# Patient Record
Sex: MALE | Race: BLACK OR AFRICAN AMERICAN | NOT HISPANIC OR LATINO | Employment: OTHER | URBAN - METROPOLITAN AREA
[De-identification: names, ages, dates, MRNs, and addresses within clinical notes are randomized per-mention and may not be internally consistent; named-entity substitution may affect disease eponyms.]

---

## 2017-01-20 DIAGNOSIS — J44.1 COPD EXACERBATION: ICD-10-CM

## 2017-01-20 DIAGNOSIS — J01.90 ACUTE SINUSITIS, UNSPECIFIED: ICD-10-CM

## 2017-01-20 DIAGNOSIS — R05.9 COUGH: ICD-10-CM

## 2017-01-20 RX ORDER — AZITHROMYCIN 250 MG/1
TABLET, FILM COATED ORAL
Qty: 6 TABLET | Refills: 1 | Status: SHIPPED | OUTPATIENT
Start: 2017-01-20 | End: 2017-08-29 | Stop reason: ALTCHOICE

## 2017-03-20 DIAGNOSIS — J44.1 COPD EXACERBATION: ICD-10-CM

## 2017-03-20 DIAGNOSIS — R05.9 COUGH: ICD-10-CM

## 2017-03-20 RX ORDER — METHYLPREDNISOLONE 4 MG/1
TABLET ORAL
Qty: 21 TABLET | Refills: 1 | Status: SHIPPED | OUTPATIENT
Start: 2017-03-20 | End: 2017-08-29 | Stop reason: ALTCHOICE

## 2017-08-28 RX ORDER — FLUTICASONE PROPIONATE 50 MCG
SPRAY, SUSPENSION (ML) NASAL
Qty: 16 G | Refills: 5 | Status: SHIPPED | OUTPATIENT
Start: 2017-08-28 | End: 2021-03-03

## 2017-08-29 ENCOUNTER — PROCEDURE VISIT (OUTPATIENT)
Dept: PULMONOLOGY | Facility: CLINIC | Age: 67
End: 2017-08-29
Payer: MEDICARE

## 2017-08-29 ENCOUNTER — OFFICE VISIT (OUTPATIENT)
Dept: PULMONOLOGY | Facility: CLINIC | Age: 67
End: 2017-08-29
Payer: MEDICARE

## 2017-08-29 ENCOUNTER — HOSPITAL ENCOUNTER (OUTPATIENT)
Dept: RADIOLOGY | Facility: HOSPITAL | Age: 67
Discharge: HOME | End: 2017-08-29
Attending: INTERNAL MEDICINE
Payer: MEDICARE

## 2017-08-29 VITALS
SYSTOLIC BLOOD PRESSURE: 150 MMHG | WEIGHT: 240 LBS | OXYGEN SATURATION: 99 % | BODY MASS INDEX: 32.51 KG/M2 | HEIGHT: 72 IN | RESPIRATION RATE: 20 BRPM | DIASTOLIC BLOOD PRESSURE: 88 MMHG | HEART RATE: 78 BPM

## 2017-08-29 DIAGNOSIS — J45.20 MILD INTERMITTENT ASTHMA WITHOUT COMPLICATION: ICD-10-CM

## 2017-08-29 DIAGNOSIS — J44.89 ASTHMA WITH COPD: ICD-10-CM

## 2017-08-29 DIAGNOSIS — G47.33 OSA ON CPAP: Primary | ICD-10-CM

## 2017-08-29 PROCEDURE — 99999 PR PBB SHADOW E&M-EST. PATIENT-LVL IV: CPT | Mod: PBBFAC,,, | Performed by: NURSE PRACTITIONER

## 2017-08-29 PROCEDURE — 71020 XR CHEST PA AND LATERAL: CPT | Mod: TC,PO

## 2017-08-29 PROCEDURE — 3008F BODY MASS INDEX DOCD: CPT | Mod: S$GLB,,, | Performed by: NURSE PRACTITIONER

## 2017-08-29 PROCEDURE — 71020 XR CHEST PA AND LATERAL: CPT | Mod: 26,,, | Performed by: RADIOLOGY

## 2017-08-29 PROCEDURE — 99214 OFFICE O/P EST MOD 30 MIN: CPT | Mod: 25,S$GLB,, | Performed by: NURSE PRACTITIONER

## 2017-08-29 PROCEDURE — 94060 EVALUATION OF WHEEZING: CPT | Mod: S$GLB,,, | Performed by: INTERNAL MEDICINE

## 2017-08-29 RX ORDER — PROMETHAZINE HYDROCHLORIDE AND CODEINE PHOSPHATE 6.25; 1 MG/5ML; MG/5ML
SOLUTION ORAL
Refills: 0 | COMMUNITY
Start: 2017-08-13 | End: 2021-01-19 | Stop reason: SDUPTHER

## 2017-08-29 RX ORDER — FLUTICASONE FUROATE AND VILANTEROL 200; 25 UG/1; UG/1
1 POWDER RESPIRATORY (INHALATION) DAILY
Qty: 1 EACH | Refills: 11 | Status: SHIPPED | OUTPATIENT
Start: 2017-08-29 | End: 2018-10-30 | Stop reason: SDUPTHER

## 2017-08-29 RX ORDER — DICLOFENAC SODIUM 10 MG/G
GEL TOPICAL
COMMUNITY
Start: 2017-07-23 | End: 2021-10-26

## 2017-08-29 RX ORDER — GABAPENTIN 300 MG/1
300 CAPSULE ORAL 3 TIMES DAILY
COMMUNITY
Start: 2017-08-08

## 2017-08-29 RX ORDER — FLUTICASONE PROPIONATE AND SALMETEROL 500; 50 UG/1; UG/1
1 POWDER RESPIRATORY (INHALATION) 2 TIMES DAILY
COMMUNITY
End: 2017-08-29

## 2017-08-29 NOTE — PROGRESS NOTES
"Subjective:      Chief Complaint   Patient presents with    Asthma    COPD        Speedy Valle is an 67 y.o. male who presents for follow up of asthma.   The patient is currently having symptoms. Current symptoms include chest tightness, dyspnea, non-productive cough and wheezing.   Symptoms have been present since several years ago and have been unchanged. He denies chest pain and productive cough.   Associated symptoms include shortness of breath and wheezing.    This episode appears to have been triggered by cold air, dust, pollens and strong odors.   Treatments tried for the current exacerbation include leukotriene inhibitors, long-acting inhaled beta-adrenergic agonists and short-acting inhaled beta-adrenergic agonists, which have provided some relief of symptoms.   Patient reports he feels he has chronic flares over many years, states good days and bad days.   He feels his asthma could be better controlled.   Current Disease Severity  Speedy has weekly daytime asthma symptoms.  He has frequent nighttime asthma symptoms with coughing through the night. (decision to  The patient is using short-acting beta agonists for symptom control more than 2 days per week but not more than once a day.  He has exacerbations requiring oral systemic corticosteroids 2 times per year.   Current limitations in activity from asthma: some limitations, able to do about 15 minutes of activity, rest a few minutes then able to return to activity.   He has regular work out program walking on TM once a week x 30 minutes.  Swim one day a week, 10 laps (30 minutes).     Cardiologist: Dr. Monique, last seen "few" months ago.     On CPAP followed by Dr. Schwab at [facility].      Review of Systems  A comprehensive review of systems was negative except for: Ears, nose, mouth, throat, and face: positive for post nasal drp  Respiratory: positive for cough, dyspnea on exertion and wheezing      Objective:      Oxygen saturation 99%% on " room air   chest xray 8/29/2017  Comparison: 09/23/2015  Findings: The lungs are clear and free of infiltrate.    No pleural effusion or pneumothorax is identified.  The heart is not enlarged.  IMPRESSION:   1.  No acute cardiopulmonary process.    Pulmonary function tests: 8/29/2017 FEV1: 2.64  (81 % predicted), FVC:  3.43 (81 % predicted), FEV1/FVC:  77 (99 % predicted).   Post bronchodilator: FEV1: 3.00  (92 % predicted), FVC:  3.75 (88 % predicted), FEV1/FVC:  80 (103 % predicted).   Normal spirometry stable compared to 8/24/2016.     Assessment:      Moderate persistent asthma, ongoing.     ANDRZEJ on CPAP       Plan:     Problem List Items Addressed This Visit     Asthma with COPD     Not compliant with every 12 hr Advair  D/C advair  Begin Breo          Relevant Medications    fluticasone-vilanterol (BREO ELLIPTA) 200-25 mcg/dose DsDv diskus inhaler    ANDRZEJ on CPAP - Primary     Followed by [facility] with Dr. J.K. Schwab            Other Visit Diagnoses    None.        Review treatment goals of symptom prevention, minimizing limitation in activity, prevention of exacerbations and use of ER/inpatient care, maintenance of optimal pulmonary function and minimization of adverse effects of treatment.  Discussed distinction between quick-relief and controlled medications.  Discussed medication dosage, use, side effects, and goals of treatment in detail.    Warning signs of respiratory distress were reviewed with the patient.   Discussed avoidance of precipitants.      Return in about 4 weeks (around 9/26/2017) for Asthma/COPD follow up after med change to Breo . bring in cpap for download..

## 2017-08-29 NOTE — LETTER
August 29, 2017      Alfred Bernardo MD  [address] [facility] [address]  [place] [address]           [facility]  [address] [facility] [address] [place] [address]  Phone: [phone]  Fax: [phone]          Patient: Speedy Valle   MR Number: [id number]   YOB: 1950   Date of Visit: 8/29/2017       Dear Dr. Alfred Bernardo:    Thank you for referring Speedy Valle to me for evaluation. Attached you will find relevant portions of my assessment and plan of care.    If you have questions, please do not hesitate to call me. I look forward to following Speedy Valle along with you.    Sincerely,    Vero Olivares, NP    Enclosure  CC:  No Recipients    If you would like to receive this communication electronically, please contact [email or web address] or [phone] to request more information on [facility] access.    For providers and/or their staff who would like to refer a patient to [facility], please contact us through our one-stop-shop provider referral line, [facility] , at [phone].    If you feel you have received this communication in error or would no longer like to receive these types of communications, please e-mail [email or web address]

## 2017-09-05 LAB
POST FEF 25 75: 3.02 L/S (ref 1.95–3.84)
POST FET 100: 10.04 S
POST FEV1 FVC: 80 %
POST FEV1: 3 L (ref 2.82–3.7)
POST FIF 50: 1.28 L/S
POST FVC: 3.75 L (ref 3.74–4.74)
POST PEF: 9.01 L/S (ref 7.32–10.13)
PRE FEF 25 75: 2.3 L/S (ref 1.95–3.84)
PRE FET 100: 10.06 S
PRE FEV1 FVC: 77 %
PRE FEV1: 2.64 L (ref 2.82–3.7)
PRE FIF 50: 2.39 L/S
PRE FVC: 3.43 L (ref 3.74–4.74)
PRE PEF: 7.21 L/S (ref 7.32–10.13)
PREDICTED FEV1 FVC: 77.36 % (ref 72.15–82.57)
PREDICTED FEV1: 3.26 L (ref 2.82–3.7)
PREDICTED FVC: 4.24 L (ref 3.74–4.74)
PROVOCATION PROTOCOL: ABNORMAL

## 2017-09-29 ENCOUNTER — OFFICE VISIT (OUTPATIENT)
Dept: PULMONOLOGY | Facility: CLINIC | Age: 67
End: 2017-09-29
Payer: MEDICARE

## 2017-09-29 VITALS
RESPIRATION RATE: 20 BRPM | WEIGHT: 236.75 LBS | BODY MASS INDEX: 32.07 KG/M2 | OXYGEN SATURATION: 98 % | SYSTOLIC BLOOD PRESSURE: 120 MMHG | HEIGHT: 72 IN | HEART RATE: 64 BPM | DIASTOLIC BLOOD PRESSURE: 76 MMHG

## 2017-09-29 DIAGNOSIS — R05.9 COUGH: ICD-10-CM

## 2017-09-29 DIAGNOSIS — J30.9 CHRONIC ALLERGIC RHINITIS, UNSPECIFIED SEASONALITY, UNSPECIFIED TRIGGER: ICD-10-CM

## 2017-09-29 DIAGNOSIS — J45.20 MILD INTERMITTENT ASTHMA WITHOUT COMPLICATION: Primary | ICD-10-CM

## 2017-09-29 PROCEDURE — 99214 OFFICE O/P EST MOD 30 MIN: CPT | Mod: S$GLB,,, | Performed by: NURSE PRACTITIONER

## 2017-09-29 PROCEDURE — 99999 PR PBB SHADOW E&M-EST. PATIENT-LVL IV: CPT | Mod: PBBFAC,,, | Performed by: NURSE PRACTITIONER

## 2017-09-29 PROCEDURE — 3008F BODY MASS INDEX DOCD: CPT | Mod: S$GLB,,, | Performed by: NURSE PRACTITIONER

## 2017-09-29 RX ORDER — BENZONATATE 200 MG/1
200 CAPSULE ORAL 3 TIMES DAILY PRN
Qty: 30 CAPSULE | Refills: 3 | Status: SHIPPED | OUTPATIENT
Start: 2017-09-29 | End: 2018-02-11 | Stop reason: SDUPTHER

## 2017-09-29 NOTE — PROGRESS NOTES
"Subjective:      Chief Complaint   Patient presents with    Sleep Apnea    Asthma        Speedy Valle is an 67 y.o. male who presents for follow up of asthma after change of med from Advair to Breo.  He had felt his asthma had not been well controlled however was not compliant with Advair twice a day use.  Changed to once daily Breo at his last visit with me on 8/29/2017.  The patient is not currently have symptoms / an exacerbation.   Since beginning Breo he feels improved with controlled asthma.  ACT score: 23  Current medication regimen: Breo, has not used rescue since beginning breo, has used duo neb twice over past 4 weeks.    Current Disease SeverityDan has weekly daytime asthma symptoms.   He has no wheezing, but has tickling in throat at night that elicits coughing. He is not compliant with Flonase. 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 .   The patient is using short-acting beta agonists for symptom control less than or equal to 2 days per week.   He has exacerbations requiring oral systemic corticosteroids 2 times per year.   Current limitations in activity from asthma: none. Has some physical limitations related to fatigue, no wheezing or coughing, able to do about 15 minutes of activity, rest a few minutes then able to return to activity.   He has regular work out program swims twice a week, 10 laps (30 minutes).      Cardiologist: Dr. Monique, last seen "few" months ago.      On CPAP followed by Dr. Schwab at [facility], he did not bring in CPAP for download today.   He reports since his visit on 8/29/2017 with discussion of need to use nightly, he used nightly up until the past 1 week, stopped using related to staying up late and up often during night taking care of his wife who has chronic pain.   He understands importance of treatment and encourage use nightly over 4 hrs.     Review of Systems  A comprehensive review of systems was negative except for: Ears, nose, mouth, throat, " and face: positive for post nasal drip with cough upon lying down.       Objective:      Oxygen saturation 98% on room air  /76 (BP Location: Right arm, Patient Position: Sitting)   Pulse 64   Resp 20   Ht 6' (1.829 m)   Wt 107.4 kg (236 lb 12.4 oz)   SpO2 98%   BMI 32.11 kg/m²   General appearance: alert, appears stated age and cooperative  Head: Normocephalic, without obvious abnormality, atraumatic  Eyes: negative  Ears: normal TM's and external ear canals both ears  Nose: Nares normal. Septum midline. Mucosa normal. No drainage or sinus tenderness.  Throat: lips, mucosa, and tongue normal; teeth and gums normal  Neck: no adenopathy, no carotid bruit and supple, symmetrical, trachea midline  Lungs: clear to auscultation bilaterally  Heart: regular rate and rhythm, S1, S2 normal, no murmur, click, rub or gallop  Abdomen: normal findings: soft, non-tender  Extremities: extremities normal, atraumatic, no cyanosis or edema  Pulses: 2+ and symmetric  Skin: Skin color, texture, turgor normal. No rashes or lesions  Lymph nodes: Cervical, supraclavicular, and axillary nodes normal.  Neurologic: Grossly normal      Pulmonary function tests: 8/29/2017 FEV1: 2.64  (81 % predicted), FVC:  3.43 (81 % predicted), FEV1/FVC:  77 (99 % predicted).   Post bronchodilator: FEV1: 3.00  (92 % predicted), FVC:  3.75 (88 % predicted), FEV1/FVC:  80 (103 % predicted).   Normal spirometry stable compared to 8/24/2016. Improvement after bronchodilator suggest asthmatic component.    Assessment:      Intermittent asthma, improved.     chronic allergic rhinitis    cough   Plan:     Problem List Items Addressed This Visit     Chronic allergic rhinitis     Continue Singulair, resume zyrtec and flonase         Mild intermittent asthma without complication - Primary     Well controlled on Breo and Singulair. ACT 23.   Continue breo daily. Duo neb, Albuterol inhaler if needed.          Relevant Orders    Spirometry with/without  bronchodilator      Other Visit Diagnoses     Cough        pt complains of occasional cough with post nasal drip at night, resume flonase. was taking promethazine with codeine gave alternate tessalon non narcotic    Relevant Medications    benzonatate (TESSALON) 200 MG capsule         Review treatment goals of symptom prevention, minimizing limitation in activity, prevention of exacerbations and use of ER/inpatient care, maintenance of optimal pulmonary function and minimization of adverse effects of treatment.  Discussed distinction between quick-relief and controlled medications.  Discussed medication dosage, use, side effects, and goals of treatment in detail.    Warning signs of respiratory distress were reviewed with the patient.   Discussed avoidance of precipitants.      Return in about 6 months (around 3/29/2018) for asthma follow up, w/review rahel.

## 2017-09-29 NOTE — ASSESSMENT & PLAN NOTE
Well controlled on Breo and Singulair. ACT 23.   Continue breo daily. Duo neb, Albuterol inhaler if needed.

## 2018-02-11 DIAGNOSIS — R05.9 COUGH: ICD-10-CM

## 2018-02-12 RX ORDER — BENZONATATE 200 MG/1
CAPSULE ORAL
Qty: 30 CAPSULE | Refills: 1 | Status: SHIPPED | OUTPATIENT
Start: 2018-02-12 | End: 2018-03-20 | Stop reason: SDUPTHER

## 2018-03-20 DIAGNOSIS — R05.9 COUGH: ICD-10-CM

## 2018-03-20 RX ORDER — BENZONATATE 200 MG/1
CAPSULE ORAL
Qty: 90 CAPSULE | Refills: 0 | Status: SHIPPED | OUTPATIENT
Start: 2018-03-20 | End: 2021-03-03

## 2018-04-03 ENCOUNTER — PROCEDURE VISIT (OUTPATIENT)
Dept: PULMONOLOGY | Facility: CLINIC | Age: 68
End: 2018-04-03
Payer: MEDICARE

## 2018-04-03 ENCOUNTER — OFFICE VISIT (OUTPATIENT)
Dept: PULMONOLOGY | Facility: CLINIC | Age: 68
End: 2018-04-03
Payer: MEDICARE

## 2018-04-03 VITALS
OXYGEN SATURATION: 98 % | HEART RATE: 74 BPM | RESPIRATION RATE: 18 BRPM | WEIGHT: 245 LBS | SYSTOLIC BLOOD PRESSURE: 130 MMHG | DIASTOLIC BLOOD PRESSURE: 70 MMHG | HEIGHT: 72 IN | BODY MASS INDEX: 33.18 KG/M2

## 2018-04-03 DIAGNOSIS — J01.90 ACUTE NON-RECURRENT SINUSITIS, UNSPECIFIED LOCATION: ICD-10-CM

## 2018-04-03 DIAGNOSIS — J45.31 MILD PERSISTENT ASTHMA WITH ACUTE EXACERBATION: Primary | ICD-10-CM

## 2018-04-03 DIAGNOSIS — J30.89 CHRONIC NON-SEASONAL ALLERGIC RHINITIS, UNSPECIFIED TRIGGER: ICD-10-CM

## 2018-04-03 DIAGNOSIS — J45.20 MILD INTERMITTENT ASTHMA WITHOUT COMPLICATION: ICD-10-CM

## 2018-04-03 DIAGNOSIS — G47.33 OSA ON CPAP: ICD-10-CM

## 2018-04-03 PROCEDURE — 99999 PR PBB SHADOW E&M-EST. PATIENT-LVL IV: CPT | Mod: PBBFAC,,, | Performed by: NURSE PRACTITIONER

## 2018-04-03 PROCEDURE — 94060 EVALUATION OF WHEEZING: CPT | Mod: S$GLB,,, | Performed by: INTERNAL MEDICINE

## 2018-04-03 PROCEDURE — 99214 OFFICE O/P EST MOD 30 MIN: CPT | Mod: 25,S$GLB,, | Performed by: NURSE PRACTITIONER

## 2018-04-03 RX ORDER — AZELASTINE 1 MG/ML
2 SPRAY, METERED NASAL 2 TIMES DAILY
Qty: 30 ML | Refills: 11 | Status: SHIPPED | OUTPATIENT
Start: 2018-04-03 | End: 2021-03-03

## 2018-04-03 RX ORDER — AMOXICILLIN AND CLAVULANATE POTASSIUM 875; 125 MG/1; MG/1
1 TABLET, FILM COATED ORAL EVERY 12 HOURS
Qty: 20 TABLET | Refills: 0 | Status: SHIPPED | OUTPATIENT
Start: 2018-04-03 | End: 2021-03-03

## 2018-04-03 RX ORDER — PREDNISONE 20 MG/1
TABLET ORAL
Qty: 20 TABLET | Refills: 0 | Status: SHIPPED | OUTPATIENT
Start: 2018-04-03 | End: 2018-10-30 | Stop reason: SDUPTHER

## 2018-04-03 NOTE — ASSESSMENT & PLAN NOTE
Presents with sinus flare.  Continue flonase, zyrtec add astelin for allergic rhinitis not controlled on flonase and zyrtec

## 2018-04-03 NOTE — ASSESSMENT & PLAN NOTE
Presents with complaint of intermittent wheeze   ACT score 13   Jonny 4/3/2018 Normal air flow, no bronchodilator response  Continue breo, albuterol   Complete prednisone taper

## 2018-04-03 NOTE — ASSESSMENT & PLAN NOTE
Prior followed by [facility], Dr. K. Schwab  No follow up in about 2 years  [facility]  Needs new supplies and mask, reported not compliant, needs new mask and supplies.  Order provided.  Follow up 7 weeks for compliance download.

## 2018-04-03 NOTE — PROGRESS NOTES
Chief Complaint   Patient presents with    COPD    Asthma     Subjective:       Speedy Valle is a 68 y.o. male who presents for evaluation of asthma.  The patient has a history of asthma.   Age when diagnosed with Asthma adult.   The patient is currently have symptoms of coughing a lot at night. There is intermittent wheezing.   He saw Primary Care Provider, Dr. Mcqueen last month and prescribed steroid pack and antibiotic.  He feels he has not completely resolved with continue chest congestion, dry aggravating cough, post nasal drip and intermittent wheeze.     Current Disease Severity  Speedy has weekly daytime asthma symptoms. He has frequent nighttime asthma symptoms. The patient is using short-acting beta agonists for symptom control less than or equal to 2 days per week. He has exacerbations requiring oral systemic corticosteroids 1 times per year last month. Current limitations in activity from asthma: none.     CPAP   Not compliant with CPAP use. Prior patient of [facility], Dr. Schwab. Has not seen Dr. Schwab since set up with present machine about in 2016. Has been on CPAP over past 7 years.   Has complaint of needing new supplies and mask. Not compliant with regular use in part related to discomfort of full face mask.   New order for CPAP mask.      Past Medical History: The following portions of the patient's history were reviewed and updated as appropriate:   He  has a past surgical history that includes Cardiac catheterization (2010).  His family history includes Hypertension in his mother; Stroke in his mother.  He  reports that he quit smoking about 35 years ago. His smoking use included Cigarettes. He has a 21.00 pack-year smoking history. He quit smokeless tobacco use about 21 years ago. His smokeless tobacco use included Snuff. He reports that he does not drink alcohol or use drugs.  He has a current medication list which includes the following prescription(s): atorvastatin, benzonatate,  clopidogrel, diclofenac sodium, fluticasone, fluticasone-vilanterol, gabapentin, hydrocodone-acetaminophen 10-325mg, inhalation spacing device, metoprolol succinate, pantoprazole, promethazine-codeine 6.25-10 mg/5 ml, ranolazine, tamsulosin, albuterol, albuterol-ipratropium 2.5mg-0.5mg/3ml, amoxicillin-clavulanate 875-125mg, azelastine, montelukast, pramipexole, and prednisone.  He has No Known Allergies..    Review of Systems  Review of Systems   Constitutional: Negative for fever, chills, weight loss, weight gain, activity change, appetite change, fatigue and night sweats.   HENT: Negative for postnasal drip, rhinorrhea, sinus pressure, voice change and congestion.    Eyes: Negative for redness and itching.   Respiratory: Positive for cough, wheezing (intermittent) and dyspnea on extertion ( with prolonged exertion ). Negative for snoring, sputum production, chest tightness, shortness of breath, orthopnea, asthma nighttime symptoms, use of rescue inhaler and somnolence.    Cardiovascular: Negative.  Negative for chest pain, palpitations and leg swelling.   Genitourinary: Negative for difficulty urinating and hematuria.   Endocrine: Negative for cold intolerance and heat intolerance.    Musculoskeletal: Negative for arthralgias, gait problem, joint swelling and myalgias.   Skin: Negative.    Gastrointestinal: Negative for nausea, vomiting, abdominal pain and acid reflux.   Neurological: Negative for dizziness, weakness, light-headedness and headaches.   Hematological: Negative for adenopathy. No excessive bruising.   All other systems reviewed and are negative.    Objective:     /70 (BP Location: Right arm, Patient Position: Sitting)   Pulse 74   Resp 18   Ht 6' (1.829 m)   Wt 111.1 kg (245 lb)   SpO2 98%   BMI 33.23 kg/m²   Physical Exam   Constitutional: He is oriented to person, place, and time. He appears well-developed and well-nourished. He is active and cooperative.  Non-toxic appearance. He does  not appear ill. No distress.   HENT:   Head: Normocephalic and atraumatic.   Right Ear: External ear normal.   Left Ear: External ear normal.   Nose: Nose normal.   Mouth/Throat: Oropharynx is clear and moist. No oropharyngeal exudate.   Eyes: Conjunctivae are normal.   Neck: Normal range of motion. Neck supple.   Cardiovascular: Normal rate, regular rhythm, normal heart sounds and intact distal pulses.    Pulmonary/Chest: Effort normal and breath sounds normal. He has no wheezes. He has no rales.   Abdominal: Soft. Bowel sounds are normal.   Musculoskeletal: He exhibits no edema.   Neurological: He is alert and oriented to person, place, and time.   Skin: Skin is warm and dry.   Psychiatric: He has a normal mood and affect. His behavior is normal. Judgment and thought content normal.   Vitals reviewed.    Diagnostic Testing Reviewed  All relevant imaging and labs reviewed.  Pulmonary function tests: 4/3/2018 FEV1: 2.92  (90 % predicted), FVC:  3.69 (87 % predicted), FEV1/FVC:  79 (103 % predicted).  Post bronchodilator: FEV1: 2.99  (92 % predicted), FVC:  3.66 (87 % predicted), FEV1/FVC:  82 (106 % predicted).   Normal spirometry. No bronchodilator response.   FEV1 improved 11% compared to 8/2017.     Assessment:     1. Mild persistent asthma with acute exacerbation    2. Acute non-recurrent sinusitis, unspecified location    3. Chronic non-seasonal allergic rhinitis, unspecified trigger    4. Mild intermittent asthma without complication    5. ANDRZEJ on CPAP      Orders Placed This Encounter   Procedures    CPAP/BIPAP SUPPLIES     Prior patient of Dr. K. Schwab  Needs new supply order and new mask, not compliant related needs new supplies and new better fitting mask.  90 day supply. 4 refills.    [facility]: new to [facility]    compliance follow up in provider office in 7 weeks     Order Specific Question:   Type of mask:     Answer:   FFM     Order Specific Question:   Headgear?     Answer:   Yes     Order Specific  Question:   Tubing?     Answer:   Yes     Order Specific Question:   Humidifier chamber?     Answer:   Yes     Order Specific Question:   Chin strap?     Answer:   Yes     Order Specific Question:   Filters?     Answer:   Yes     Order Specific Question:   Length of need (1-99 months):     Answer:   99     Plan:     Problem List Items Addressed This Visit     Chronic allergic rhinitis     Presents with sinus flare.  Continue flonase, zyrtec add astelin for allergic rhinitis not controlled on flonase and zyrtec           Relevant Medications    azelastine (ASTELIN) 137 mcg (0.1 %) nasal spray    Mild intermittent asthma without complication     Presents with complaint of intermittent wheeze   ACT score 13   Jonny 4/3/2018 Normal air flow, no bronchodilator response  Continue breo, albuterol   Complete prednisone taper         ANDRZEJ on CPAP     Prior followed by [facility], Dr. K. Schwab  No follow up in about 2 years  Would like to transfer care to [facility]  Needs new supplies and mask, reported not compliant, needs new mask and supplies.  Order provided.  Follow up 7 weeks for compliance download.          Relevant Orders    CPAP/BIPAP SUPPLIES      Other Visit Diagnoses     Mild persistent asthma with acute exacerbation    -  Primary    complete prednisone taper continue breo, albuterol     Relevant Medications    predniSONE (DELTASONE) 20 MG tablet    Acute non-recurrent sinusitis, unspecified location        complete augmentin, begin astelin, flonase, continue zytec, fu with ent if not improved.     Relevant Medications    amoxicillin-clavulanate 875-125mg (AUGMENTIN) 875-125 mg per tablet        Follow-up in about 7 weeks (around 5/22/2018) for CPAP compliance follow up/reevaluate post acute asthma flare .

## 2018-04-04 LAB
POST FEF 25 75: 3.39 L/S (ref 1.91–3.8)
POST FET 100: 18.18 S
POST FEV1 FVC: 82 %
POST FEV1: 2.99 L (ref 2.79–3.68)
POST FIF 50: 1.88 L/S
POST FVC: 3.66 L (ref 3.72–4.72)
POST PEF: 8.36 L/S (ref 7.28–10.09)
PRE FEF 25 75: 3.02 L/S (ref 1.91–3.8)
PRE FET 100: 17.19 S
PRE FEV1 FVC: 79 %
PRE FEV1: 2.92 L (ref 2.79–3.68)
PRE FIF 50: 1.12 L/S
PRE FVC: 3.69 L (ref 3.72–4.72)
PRE PEF: 7.91 L/S (ref 7.28–10.09)
PREDICTED FEV1 FVC: 77.17 % (ref 71.97–82.38)
PREDICTED FEV1: 3.24 L (ref 2.79–3.68)
PREDICTED FVC: 4.22 L (ref 3.72–4.72)
PROVOCATION PROTOCOL: ABNORMAL

## 2018-10-30 ENCOUNTER — OFFICE VISIT (OUTPATIENT)
Dept: PULMONOLOGY | Facility: CLINIC | Age: 68
End: 2018-10-30
Payer: MEDICARE

## 2018-10-30 VITALS
RESPIRATION RATE: 18 BRPM | SYSTOLIC BLOOD PRESSURE: 110 MMHG | DIASTOLIC BLOOD PRESSURE: 70 MMHG | WEIGHT: 232.81 LBS | HEIGHT: 74 IN | HEART RATE: 71 BPM | BODY MASS INDEX: 29.88 KG/M2 | OXYGEN SATURATION: 99 %

## 2018-10-30 DIAGNOSIS — J45.20 MILD INTERMITTENT ASTHMA WITHOUT COMPLICATION: ICD-10-CM

## 2018-10-30 DIAGNOSIS — G47.33 OSA ON CPAP: Primary | ICD-10-CM

## 2018-10-30 DIAGNOSIS — J45.31 MILD PERSISTENT ASTHMA WITH ACUTE EXACERBATION: ICD-10-CM

## 2018-10-30 DIAGNOSIS — J44.89 ASTHMA WITH COPD: ICD-10-CM

## 2018-10-30 PROCEDURE — 99214 OFFICE O/P EST MOD 30 MIN: CPT | Mod: PBBFAC,PO | Performed by: NURSE PRACTITIONER

## 2018-10-30 PROCEDURE — 99999 PR PBB SHADOW E&M-EST. PATIENT-LVL IV: CPT | Mod: PBBFAC,,, | Performed by: NURSE PRACTITIONER

## 2018-10-30 PROCEDURE — 1101F PT FALLS ASSESS-DOCD LE1/YR: CPT | Mod: CPTII,,, | Performed by: NURSE PRACTITIONER

## 2018-10-30 PROCEDURE — 99214 OFFICE O/P EST MOD 30 MIN: CPT | Mod: S$PBB,,, | Performed by: NURSE PRACTITIONER

## 2018-10-30 RX ORDER — FLUTICASONE FUROATE AND VILANTEROL 200; 25 UG/1; UG/1
1 POWDER RESPIRATORY (INHALATION) DAILY
Qty: 1 EACH | Refills: 11 | Status: SHIPPED | OUTPATIENT
Start: 2018-10-30 | End: 2019-04-26 | Stop reason: SDUPTHER

## 2018-10-30 RX ORDER — ALBUTEROL SULFATE 90 UG/1
2 AEROSOL, METERED RESPIRATORY (INHALATION) EVERY 4 HOURS PRN
Qty: 18 G | Refills: 11 | Status: SHIPPED | OUTPATIENT
Start: 2018-10-30 | End: 2019-04-26 | Stop reason: SDUPTHER

## 2018-10-30 RX ORDER — PREDNISONE 20 MG/1
TABLET ORAL
Qty: 20 TABLET | Refills: 0 | Status: SHIPPED | OUTPATIENT
Start: 2018-10-30 | End: 2021-03-03

## 2018-10-30 NOTE — PROGRESS NOTES
Chief Complaint   Patient presents with    Sleep Apnea    Asthma    COPD     Subjective:       Speedy Valle is a 68 y.o. male who presents for 1 year follow up on CPAP.  He was also seen 6 month with asthma flare. Last seen by me April 2018.   The patient has a history of asthma.   Age when diagnosed with Asthma adult.   The patient is currently have symptoms or exacerbation. Shortness of breath and intermittent wheezing flared over past 2 weeks, he feels asthma is somewhat controlled.  No fever, no mucous production.   ACT score 15     Current Disease Severity  Speedy has frequent daytime asthma symptoms. He has no nighttime asthma symptoms. The patient is using short-acting beta agonists for symptom control less than or equal to 2 days per week. He has exacerbations requiring oral systemic corticosteroids 2 times per year last month. Current limitations in activity from asthma: none.     CPAP   Not compliant with CPAP use. [facility] patient [facility], Dr. Schwab. Has not seen Dr. Schwab since set up with present machine about in 2016. Has been on CPAP over past 7 years.   Has complaint of needing new supplies and mask. Not compliant with regular use in part related to discomfort of full face mask.   New order for CPAP mask.     HPI: Speedy Valle is here for follow up for ANDRZEJ and CPAP complaince assessment.   He is on CPAP of 12 cmH2O pressure.   He is not compliant with CPAP use. Complaince download today reveals 6 % of days with greater than 4 hours of device use.   Patient reports benefit from CPAP use and denies snoring and excessive daytime sleepiness.  Patient reports no complaints. FFM is used. He is not good about use since complains of dry mouth. Prefers ffm, dreamwear instead of nasal mask, encouraged use of chin strap, new supply order.  [place] [address]    Past Medical History: The following portions of the patient's history were reviewed and updated as appropriate:   He  has a past surgical history  that includes Cardiac catheterization (2010).  His family history includes Hypertension in his mother; Stroke in his mother.  He  reports that he quit smoking about 36 years ago. His smoking use included cigarettes. He has a 21.00 pack-year smoking history. He quit smokeless tobacco use about 22 years ago. His smokeless tobacco use included snuff. He reports that he does not drink alcohol or use drugs.  He has a current medication list which includes the following prescription(s): albuterol, amoxicillin-clavulanate 875-125mg, atorvastatin, azelastine, benzonatate, clopidogrel, diclofenac sodium, fluticasone, fluticasone-vilanterol, gabapentin, hydrocodone-acetaminophen, inhalation spacing device, metoprolol succinate, montelukast, pantoprazole, pramipexole, ranolazine, tamsulosin, albuterol-ipratropium, prednisone, and promethazine-codeine 6.25-10 mg/5 ml.  He has No Known Allergies.    Review of Systems  Review of Systems   Constitutional: Negative for fever, chills, weight loss, weight gain, activity change, appetite change, fatigue and night sweats.   HENT: Negative for postnasal drip, rhinorrhea, sinus pressure, voice change and congestion.    Eyes: Negative for redness and itching.   Respiratory: Negative for snoring, cough, sputum production, chest tightness, shortness of breath, wheezing, orthopnea, asthma nighttime symptoms, dyspnea on extertion, use of rescue inhaler and somnolence.    Cardiovascular: Negative.  Negative for chest pain, palpitations and leg swelling.   Genitourinary: Negative for difficulty urinating and hematuria.   Endocrine: Negative for cold intolerance and heat intolerance.    Musculoskeletal: Negative for arthralgias, gait problem, joint swelling and myalgias.   Skin: Negative.    Gastrointestinal: Negative for nausea, vomiting, abdominal pain and acid reflux.   Neurological: Negative for dizziness, weakness, light-headedness and headaches.   Hematological: Negative for adenopathy. No  "excessive bruising.   All other systems reviewed and are negative.    Objective:     /70   Pulse 71   Resp 18   Ht 6' 2" (1.88 m)   Wt 105.6 kg (232 lb 12.9 oz)   SpO2 99%   BMI 29.89 kg/m²   Physical Exam   Constitutional: He is oriented to person, place, and time. He appears well-developed and well-nourished. He is active and cooperative.  Non-toxic appearance. He does not appear ill. No distress.   HENT:   Head: Normocephalic and atraumatic.   Right Ear: External ear normal.   Left Ear: External ear normal.   Nose: Nose normal.   Mouth/Throat: Oropharynx is clear and moist. No oropharyngeal exudate.   Eyes: Conjunctivae are normal.   Neck: Normal range of motion. Neck supple.   Cardiovascular: Normal rate, regular rhythm, normal heart sounds and intact distal pulses.   Pulmonary/Chest: Effort normal and breath sounds normal. He has no wheezes. He has no rales.   Abdominal: Soft. Bowel sounds are normal.   Musculoskeletal: He exhibits no edema.   Neurological: He is alert and oriented to person, place, and time.   Skin: Skin is warm and dry.   Psychiatric: He has a normal mood and affect. His behavior is normal. Judgment and thought content normal.   Vitals reviewed.    Diagnostic Testing Reviewed  All relevant imaging and labs reviewed.    Pulmonary function tests: 4/3/2018 FEV1: 2.92  (90 % predicted), FVC:  3.69 (87 % predicted), FEV1/FVC:  79 (103 % predicted).  Post bronchodilator: FEV1: 2.99  (92 % predicted), FVC:  3.66 (87 % predicted), FEV1/FVC:  82 (106 % predicted).   Normal spirometry. No bronchodilator response.   FEV1 improved 11% compared to 8/2017.     Assessment:     1. ANDRZEJ on CPAP    2. Mild persistent asthma with acute exacerbation    3. Asthma with COPD    4. Mild intermittent asthma without complication      Orders Placed This Encounter   Procedures    CPAP/BIPAP SUPPLIES     Benefits and compliant   Yearly supply order  FFM dreamwear   Needs chin strap   90 day supply. 4 " refills.  HME: [facility]     Order Specific Question:   Type of mask:     Answer:   FFM     Order Specific Question:   Headgear?     Answer:   Yes     Order Specific Question:   Tubing?     Answer:   Yes     Order Specific Question:   Humidifier chamber?     Answer:   Yes     Order Specific Question:   Chin strap?     Answer:   Yes     Order Specific Question:   Filters?     Answer:   Yes     Order Specific Question:   Length of need (1-99 months):     Answer:   99    X-Ray Chest PA And Lateral     Standing Status:   Future     Standing Expiration Date:   10/30/2019     Order Specific Question:   May the Radiologist modify the order per protocol to meet the clinical needs of the patient?     Answer:   Yes    Spirometry with/without bronchodilator     Standing Status:   Future     Standing Expiration Date:   10/30/2019     Plan:     Problem List Items Addressed This Visit     Asthma with COPD     Flared, prednisone taper   No indication for antibiotic therapy   Consider change to Trelegy if continues to not be controlled on Breo 200 mcg.   Recent flare since travel to [place] over the 2 weeks.          Relevant Medications    fluticasone-vilanterol (BREO ELLIPTA) 200-25 mcg/dose DsDv diskus inhaler    albuterol (VENTOLIN HFA) 90 mcg/actuation inhaler    Other Relevant Orders    Spirometry with/without bronchodilator    X-Ray Chest PA And Lateral    Mild intermittent asthma without complication     Not well controlled ACT score 15   Prednisone taper   Continue breo 200 mcg daily, albuterol, duo nebs if needed  With asthma COPD diagnosis consider change to Trelegy if continues to have frequent exacerbations.         Relevant Orders    Spirometry with/without bronchodilator    X-Ray Chest PA And Lateral    ANDRZEJ on CPAP - Primary     Benefits, traveled to [place] on cruise recently returned 2 days ago.  Did not bring CPAP.  Adherence   On CPAP 12 cm   FFM    HME: [facility]          Relevant Orders    CPAP/BIPAP SUPPLIES       Other Visit Diagnoses     Mild persistent asthma with acute exacerbation        complete prednisone taper continue breo, albuterol     Relevant Medications    predniSONE (DELTASONE) 20 MG tablet        Follow-up in about 6 months (around 4/30/2019).

## 2018-10-30 NOTE — ASSESSMENT & PLAN NOTE
Flared, prednisone taper   No indication for antibiotic therapy   Consider change to Trelegy if continues to not be controlled on Breo 200 mcg.   Recent flare since travel to [place] over the 2 weeks.

## 2018-10-30 NOTE — ASSESSMENT & PLAN NOTE
Benefits, traveled to [place] on cruise recently returned 2 days ago.  Did not bring CPAP.  Adherence   On CPAP 12 cm   FFM    HME: [facility]

## 2018-10-30 NOTE — ASSESSMENT & PLAN NOTE
Not well controlled ACT score 15   Prednisone taper   Continue breo 200 mcg daily, albuterol, duo nebs if needed  With asthma COPD diagnosis consider change to Trelegy if continues to have frequent exacerbations.

## 2019-04-26 ENCOUNTER — CLINICAL SUPPORT (OUTPATIENT)
Dept: PULMONOLOGY | Facility: CLINIC | Age: 69
End: 2019-04-26
Payer: MEDICARE

## 2019-04-26 ENCOUNTER — OFFICE VISIT (OUTPATIENT)
Dept: PULMONOLOGY | Facility: CLINIC | Age: 69
End: 2019-04-26
Payer: MEDICARE

## 2019-04-26 ENCOUNTER — HOSPITAL ENCOUNTER (OUTPATIENT)
Dept: RADIOLOGY | Facility: HOSPITAL | Age: 69
Discharge: HOME | End: 2019-04-26
Attending: NURSE PRACTITIONER
Payer: MEDICARE

## 2019-04-26 VITALS
HEART RATE: 54 BPM | SYSTOLIC BLOOD PRESSURE: 160 MMHG | HEIGHT: 74 IN | DIASTOLIC BLOOD PRESSURE: 100 MMHG | OXYGEN SATURATION: 97 % | WEIGHT: 221.31 LBS | RESPIRATION RATE: 17 BRPM | BODY MASS INDEX: 28.4 KG/M2

## 2019-04-26 DIAGNOSIS — I10 ESSENTIAL HYPERTENSION: Chronic | ICD-10-CM

## 2019-04-26 DIAGNOSIS — J44.89 ASTHMA WITH COPD: ICD-10-CM

## 2019-04-26 DIAGNOSIS — N40.0 ENLARGED PROSTATE: ICD-10-CM

## 2019-04-26 DIAGNOSIS — J30.9 CHRONIC ALLERGIC RHINITIS: ICD-10-CM

## 2019-04-26 DIAGNOSIS — G47.33 OBSTRUCTIVE SLEEP APNEA: ICD-10-CM

## 2019-04-26 DIAGNOSIS — J45.20 MILD INTERMITTENT ASTHMA WITHOUT COMPLICATION: ICD-10-CM

## 2019-04-26 DIAGNOSIS — G25.81 RESTLESS LEG SYNDROME, CONTROLLED: Chronic | ICD-10-CM

## 2019-04-26 DIAGNOSIS — G47.33 OSA ON CPAP: ICD-10-CM

## 2019-04-26 DIAGNOSIS — M19.90 OSTEOARTHRITIS, UNSPECIFIED OSTEOARTHRITIS TYPE, UNSPECIFIED SITE: Chronic | ICD-10-CM

## 2019-04-26 DIAGNOSIS — I25.2 HISTORY OF MI (MYOCARDIAL INFARCTION): ICD-10-CM

## 2019-04-26 DIAGNOSIS — K21.9 GASTROESOPHAGEAL REFLUX DISEASE, ESOPHAGITIS PRESENCE NOT SPECIFIED: Chronic | ICD-10-CM

## 2019-04-26 DIAGNOSIS — J44.89 ASTHMA WITH COPD: Primary | ICD-10-CM

## 2019-04-26 LAB
BRPFT: NORMAL
FEF 25 75 CHG: 17.2 %
FEF 25 75 LLN: 0.93
FEF 25 75 POST REF: 97.2 %
FEF 25 75 PRE REF: 82.9 %
FEF 25 75 REF: 2.47
FET100 CHG: 18.1 %
FEV1 CHG: 4 %
FEV1 FVC CHG: 1.8 %
FEV1 FVC LLN: 64
FEV1 FVC POST REF: 98.3 %
FEV1 FVC PRE REF: 96.5 %
FEV1 FVC REF: 76
FEV1 LLN: 2.24
FEV1 POST REF: 98.7 %
FEV1 PRE REF: 94.9 %
FEV1 REF: 3.2
FEV6 CHG: 1.5 %
FEV6 LLN: 3.3
FEV6 POST REF: 91.1 %
FEV6 POST: 3.95 L (ref 3.3–5.37)
FEV6 PRE REF: 89.7 %
FEV6 PRE: 3.89 L (ref 3.3–5.37)
FEV6 REF: 4.34
FVC CHG: 2.2 %
FVC LLN: 3.06
FVC POST REF: 100.1 %
FVC PRE REF: 97.9 %
FVC REF: 4.21
PEF CHG: -1 %
PEF LLN: 6.18
PEF POST REF: 115 %
PEF PRE REF: 116.2 %
PEF REF: 9.15
POST FEF 25 75: 2.4 L/S (ref 0.93–4.01)
POST FET 100: 14.9 SEC
POST FEV1 FVC: 74.98 % (ref 63.83–88.77)
POST FEV1: 3.16 L (ref 2.24–4.16)
POST FVC: 4.21 L (ref 3.06–5.36)
POST PEF: 10.53 L/S (ref 6.18–12.12)
PRE FEF 25 75: 2.05 L/S (ref 0.93–4.01)
PRE FET 100: 12.61 SEC
PRE FEV1 FVC: 73.66 % (ref 63.83–88.77)
PRE FEV1: 3.04 L (ref 2.24–4.16)
PRE FVC: 4.12 L (ref 3.06–5.36)
PRE PEF: 10.63 L/S (ref 6.18–12.12)

## 2019-04-26 PROCEDURE — 71046 XR CHEST PA AND LATERAL: ICD-10-PCS | Mod: 26,,, | Performed by: RADIOLOGY

## 2019-04-26 PROCEDURE — 99214 OFFICE O/P EST MOD 30 MIN: CPT | Mod: 25,S$GLB,, | Performed by: NURSE PRACTITIONER

## 2019-04-26 PROCEDURE — 71046 X-RAY EXAM CHEST 2 VIEWS: CPT | Mod: TC

## 2019-04-26 PROCEDURE — 99999 PR PBB SHADOW E&M-EST. PATIENT-LVL IV: ICD-10-PCS | Mod: PBBFAC,,, | Performed by: NURSE PRACTITIONER

## 2019-04-26 PROCEDURE — 3080F PR MOST RECENT DIASTOLIC BLOOD PRESSURE >= 90 MM HG: ICD-10-PCS | Mod: CPTII,S$GLB,, | Performed by: NURSE PRACTITIONER

## 2019-04-26 PROCEDURE — 1101F PR PT FALLS ASSESS DOC 0-1 FALLS W/OUT INJ PAST YR: ICD-10-PCS | Mod: CPTII,S$GLB,, | Performed by: NURSE PRACTITIONER

## 2019-04-26 PROCEDURE — 94060 EVALUATION OF WHEEZING: CPT | Mod: S$GLB,,, | Performed by: INTERNAL MEDICINE

## 2019-04-26 PROCEDURE — 71046 X-RAY EXAM CHEST 2 VIEWS: CPT | Mod: 26,,, | Performed by: RADIOLOGY

## 2019-04-26 PROCEDURE — 94060 PR EVAL OF BRONCHOSPASM: ICD-10-PCS | Mod: S$GLB,,, | Performed by: INTERNAL MEDICINE

## 2019-04-26 PROCEDURE — 3080F DIAST BP >= 90 MM HG: CPT | Mod: CPTII,S$GLB,, | Performed by: NURSE PRACTITIONER

## 2019-04-26 PROCEDURE — 1101F PT FALLS ASSESS-DOCD LE1/YR: CPT | Mod: CPTII,S$GLB,, | Performed by: NURSE PRACTITIONER

## 2019-04-26 PROCEDURE — 99214 PR OFFICE/OUTPT VISIT, EST, LEVL IV, 30-39 MIN: ICD-10-PCS | Mod: 25,S$GLB,, | Performed by: NURSE PRACTITIONER

## 2019-04-26 PROCEDURE — 3077F SYST BP >= 140 MM HG: CPT | Mod: CPTII,S$GLB,, | Performed by: NURSE PRACTITIONER

## 2019-04-26 PROCEDURE — 99999 PR PBB SHADOW E&M-EST. PATIENT-LVL IV: CPT | Mod: PBBFAC,,, | Performed by: NURSE PRACTITIONER

## 2019-04-26 PROCEDURE — 3077F PR MOST RECENT SYSTOLIC BLOOD PRESSURE >= 140 MM HG: ICD-10-PCS | Mod: CPTII,S$GLB,, | Performed by: NURSE PRACTITIONER

## 2019-04-26 RX ORDER — ALBUTEROL SULFATE 90 UG/1
2 AEROSOL, METERED RESPIRATORY (INHALATION) EVERY 4 HOURS PRN
Qty: 18 G | Refills: 11 | Status: SHIPPED | OUTPATIENT
Start: 2019-04-26 | End: 2021-01-19 | Stop reason: SDUPTHER

## 2019-04-26 RX ORDER — IPRATROPIUM BROMIDE AND ALBUTEROL SULFATE 2.5; .5 MG/3ML; MG/3ML
3 SOLUTION RESPIRATORY (INHALATION) EVERY 6 HOURS
Qty: 150 VIAL | Refills: 11 | Status: SHIPPED | OUTPATIENT
Start: 2019-04-26 | End: 2021-01-19 | Stop reason: SDUPTHER

## 2019-04-26 RX ORDER — FLUTICASONE FUROATE AND VILANTEROL 200; 25 UG/1; UG/1
1 POWDER RESPIRATORY (INHALATION) DAILY
Qty: 1 EACH | Refills: 11 | Status: SHIPPED | OUTPATIENT
Start: 2019-04-26 | End: 2021-01-19 | Stop reason: ALTCHOICE

## 2019-04-26 NOTE — ASSESSMENT & PLAN NOTE
Benefits, off CPAP, needed supplies, then CPAP machine malfunction.  Ready to resume CPAP therapy with excessive daytime sleepiness, difficulty call to control hypertension, awakening during the night.  Patient opts for nasal CPAP mask trial  Prior setting CPAP 12 cm  Orders placed for new CPAP machine set on CPAP 12 cm with nasal mask.   [facility] patient of [facility], Dr. Schwab. Has not seen Dr. Schwab since set up with present machine about in 2016.

## 2019-04-26 NOTE — ASSESSMENT & PLAN NOTE
Current well controlled on Breo 200 mcg.  Albuterol inhaler, duo nebs on hand if needed.  Continue current treatment plan

## 2019-04-26 NOTE — PROGRESS NOTES
Chief Complaint   Patient presents with    Sleep Apnea    Asthma     Subjective:       Speedy Valle is a 69 y.o. male who presents for 1 year follow up on Asthma with review of chest xray and spirometry reveals normal spirometry no bronchodilator response, remains stable compared to a prior 04/2018 normal spirometry. Chest xray is clear.   Last seen by me April 2018.   The patient has a history of asthma.   Age when diagnosed with Asthma adult.   The patient is currently not having symptoms.   He scores his act as 20, asthma scores 19 or greater indicate well controlled asthma.  He is not using Breo or albuterol or nebs on regular basis.   Breo is used about once every other day. He does not feel like he needs inhaler on a regular basis. And other days forgets to use.   No fever, no cough, rare wheezing, no mucous production.    Current Disease Severity  Speedy has weekly daytime asthma symptoms, shortness of breath about 2 twice week with use of either Breo or albuterol inhaler. He has monthly nighttime asthma symptoms. The patient is using short-acting beta agonists for symptom control less than or equal to 2 days per week.  Current limitations in activity from asthma: none.     Sleep apnea on CPAP   Not compliant with CPAP use. [facility] Dr. Schwab. Has not seen Dr. Schwab since set up with present machine about in 2016. Has been on CPAP over past 7 years.   He states his CPAP machine is not working properly, his CPAP machine turns off right after turning on. [facility] after his visit in October 2018. He even went to another e and not able to help him.  He reports [employer] called him and stated if obtained new script then could obtain a new CPAP machine.   He is requesting nasal CPAP mask.   He was on CPAP of 12 cmH2O pressure in past and reports well tolerated with benefit.  No download available with machine malfunction.    [place] [address], sleepy  and awakening at night since off CPAP.     Past Medical History: The following portions of the patient's history were reviewed and updated as appropriate:   He  has a past surgical history that includes Cardiac catheterization (2010).  His family history includes Hypertension in his mother; Stroke in his mother.  He  reports that he quit smoking about 36 years ago. His smoking use included cigarettes. He has a 21.00 pack-year smoking history. He quit smokeless tobacco use about 22 years ago. His smokeless tobacco use included snuff. He reports that he does not drink alcohol or use drugs.  He has a current medication list which includes the following prescription(s): albuterol, albuterol-ipratropium, atorvastatin, benzonatate, clopidogrel, diclofenac sodium, fluticasone, fluticasone-vilanterol, gabapentin, hydrocodone-acetaminophen, inhalation spacing device, metoprolol succinate, montelukast, pantoprazole, pramipexole, promethazine-codeine 6.25-10 mg/5 ml, ranolazine, amoxicillin-clavulanate 875-125mg, azelastine, prednisone, and tamsulosin.  He has No Known Allergies.    Review of Systems  Review of Systems   Constitutional: Negative for fever, chills, weight loss, weight gain, activity change, appetite change, fatigue and night sweats.   HENT: Negative for postnasal drip, rhinorrhea, sinus pressure, voice change and congestion.    Eyes: Negative for redness and itching.   Respiratory: Negative for snoring, cough, sputum production, chest tightness, shortness of breath, wheezing, orthopnea, asthma nighttime symptoms, dyspnea on extertion, use of rescue inhaler and somnolence.    Cardiovascular: Negative.  Negative for chest pain, palpitations and leg swelling.   Genitourinary: Negative for difficulty urinating and hematuria.   Endocrine: Negative for cold intolerance and heat intolerance.    Musculoskeletal: Negative for arthralgias, gait problem, joint swelling and myalgias.   Skin: Negative.    Gastrointestinal:  "Negative for nausea, vomiting, abdominal pain and acid reflux.   Neurological: Negative for dizziness, weakness, light-headedness and headaches.   Hematological: Negative for adenopathy. No excessive bruising.   All other systems reviewed and are negative.    Objective:     BP (!) 160/100   Pulse (!) 54   Resp 17   Ht 6' 2" (1.88 m)   Wt 100.4 kg (221 lb 5.5 oz)   SpO2 97%   BMI 28.42 kg/m²   Physical Exam   Constitutional: He is oriented to person, place, and time. He appears well-developed and well-nourished. He is active and cooperative.  Non-toxic appearance. He does not appear ill. No distress.   HENT:   Head: Normocephalic and atraumatic.   Right Ear: External ear normal.   Left Ear: External ear normal.   Nose: Nose normal.   Mouth/Throat: Oropharynx is clear and moist. No oropharyngeal exudate.   Eyes: Conjunctivae are normal.   Neck: Normal range of motion. Neck supple.   Cardiovascular: Normal rate, regular rhythm, normal heart sounds and intact distal pulses.   Pulmonary/Chest: Effort normal and breath sounds normal. He has no wheezes. He has no rales.   Abdominal: Soft. Bowel sounds are normal.   Musculoskeletal: He exhibits no edema.   Neurological: He is alert and oriented to person, place, and time.   Skin: Skin is warm and dry.   Psychiatric: He has a normal mood and affect. His behavior is normal. Judgment and thought content normal.   Vitals reviewed.    Diagnostic Testing Reviewed  All relevant imaging and labs reviewed.    X-Ray Chest PA And Lateral  Narrative: EXAMINATION:  XR CHEST PA AND LATERAL    CLINICAL HISTORY:  Chronic obstructive pulmonary disease, unspecified    TECHNIQUE:  PA and lateral views of the chest were performed.    COMPARISON:  08/29/2017    FINDINGS:  Cardiac silhouette and mediastinal contours are normal.  Lungs are clear.  Osseous structures are intact.  Impression: No acute cardiopulmonary process.    Electronically signed by: Lance Frederick, " MD  Date:    04/26/2019  Time:    09:03    4/26/2019 Spirometry  Normal spirometry. Airflow is not clearly improved after bronchodilator. Clinical improvement following bronchodilator therapy may occur in the absence of spirometric improvement. Compared to previous test of 04/03/18: No significant change.    Pulmonary function tests: 4/3/2018 FEV1: 2.92  (90 % predicted), FVC:  3.69 (87 % predicted), FEV1/FVC:  79 (103 % predicted).  Post bronchodilator: FEV1: 2.99  (92 % predicted), FVC:  3.66 (87 % predicted), FEV1/FVC:  82 (106 % predicted).   Normal spirometry. No bronchodilator response.   FEV1 improved 11% compared to 8/2017.     Assessment:     1. Obstructive sleep apnea    2. ANDRZEJ on CPAP    3. Asthma with COPD    4. Chronic allergic rhinitis    5. Essential hypertension    6. History of MI (myocardial infarction)    7. Gastroesophageal reflux disease, esophagitis presence not specified    8. Restless leg syndrome, controlled    9. Enlarged prostate    10. Osteoarthritis, unspecified osteoarthritis type, unspecified site      Orders Placed This Encounter   Procedures    CPAP FOR HOME USE     Malfunction of current CPAP machine needs replacement. Lapse of CPAP therapy over past at least 1 year.   Patient was told by [employer] if obtained new order then could obtain a new CPAP machine.   Please let me know if he needs a repeat study to obtain new machine.     Order Specific Question:   Type:     Answer:   CPAP     Order Specific Question:   CPAP setting (cmH20):     Answer:   12     Order Specific Question:   Length of need (1-99 months):     Answer:   99     Order Specific Question:   Humidification:     Answer:   Heated     Order Specific Question:   Type of mask:     Answer:   Nasal     Order Specific Question:   Headgear?     Answer:   Yes     Order Specific Question:   Tubing?     Answer:   Yes     Order Specific Question:   Humidifier chamber?     Answer:   Yes     Order Specific Question:   Chin  strap?     Answer:   Yes     Order Specific Question:   Filters?     Answer:   Yes     Order Specific Question:   Cushions?     Answer:   Yes     Plan:     Problem List Items Addressed This Visit     Restless leg syndrome, controlled (Chronic)     Controlled on Mirapex 0.5 mg nightly, followed by PCP           Osteoarthritis (Chronic)    ANDRZEJ on CPAP     Benefits, off CPAP, needed supplies, then CPAP machine malfunction.  Ready to resume CPAP therapy with excessive daytime sleepiness, difficulty call to control hypertension, awakening during the night.  Patient opts for nasal CPAP mask trial  Prior setting CPAP 12 cm  Orders placed for new CPAP machine set on CPAP 12 cm with nasal mask.   [facility], Dr. Schwab. Has not seen Dr. Schwab since set up with present machine about in 2016.            Relevant Orders    CPAP FOR HOME USE    Hypertension (Chronic)     Follow-up by PCP Dr. Gordon Mcqueen.  Not well controlled.  Patient is not always compliant with medication regimen.  Adherence to treatment plan  Keep follow-up with PCP  Resume CPAP therapy         History of MI (myocardial infarction)    GERD (gastroesophageal reflux disease) (Chronic)     Controlled on Protonix follow-up by PCP         Enlarged prostate     On Flomax         Chronic allergic rhinitis     Controlled on Singulair, Astelin, Flonase         Asthma with COPD - Primary     Current well controlled on Breo 200 mcg.  Albuterol inhaler, duo nebs on hand if needed.  Continue current treatment plan         Relevant Medications    fluticasone-vilanterol (BREO ELLIPTA) 200-25 mcg/dose DsDv diskus inhaler    albuterol (VENTOLIN HFA) 90 mcg/actuation inhaler    albuterol-ipratropium (DUO-NEB) 2.5 mg-0.5 mg/3 mL nebulizer solution      Other Visit Diagnoses     Obstructive sleep apnea        Relevant Orders    CPAP FOR HOME USE        Follow up in about 2 months (around 6/28/2019) for CPAP compliance dwld after replacing cpap  machine.

## 2019-04-26 NOTE — ASSESSMENT & PLAN NOTE
Follow-up by PCP Dr. Gordon Mcqueen.  Not well controlled.  Patient is not always compliant with medication regimen.  Adherence to treatment plan  Keep follow-up with PCP  Resume CPAP therapy

## 2019-06-21 ENCOUNTER — TELEPHONE (OUTPATIENT)
Dept: PULMONOLOGY | Facility: CLINIC | Age: 69
End: 2019-06-21

## 2019-06-21 NOTE — TELEPHONE ENCOUNTER
Returned call to Mrs Saldivarzoë Valle regarding denial for cpap. No answer, lvm to return my call.

## 2019-06-21 NOTE — TELEPHONE ENCOUNTER
----- Message from Mckenzie Prajapati LPN sent at 6/21/2019  8:20 AM CDT -----  Contact: Sweta Valle      ----- Message -----  From: Otilia Xiong  Sent: 6/21/2019   7:46 AM  To: [facility] Staff    States [employer] denied his CPAP and they needs his clinical notes to do an appeal. Please call Sweta Valle at [phone]. Thank you

## 2021-01-15 DIAGNOSIS — J44.89 ASTHMA WITH COPD: Primary | ICD-10-CM

## 2021-01-19 ENCOUNTER — HOSPITAL ENCOUNTER (OUTPATIENT)
Dept: RADIOLOGY | Facility: HOSPITAL | Age: 71
Discharge: HOME | End: 2021-01-19
Attending: INTERNAL MEDICINE
Payer: MEDICARE

## 2021-01-19 ENCOUNTER — OFFICE VISIT (OUTPATIENT)
Dept: PULMONOLOGY | Facility: CLINIC | Age: 71
End: 2021-01-19
Payer: MEDICARE

## 2021-01-19 VITALS
BODY MASS INDEX: 28.07 KG/M2 | HEART RATE: 62 BPM | WEIGHT: 218.69 LBS | SYSTOLIC BLOOD PRESSURE: 124 MMHG | DIASTOLIC BLOOD PRESSURE: 76 MMHG | OXYGEN SATURATION: 99 % | RESPIRATION RATE: 17 BRPM | HEIGHT: 74 IN

## 2021-01-19 DIAGNOSIS — R35.0 URINARY FREQUENCY: ICD-10-CM

## 2021-01-19 DIAGNOSIS — J44.89 ASTHMA WITH COPD: Primary | ICD-10-CM

## 2021-01-19 DIAGNOSIS — J45.31 MILD PERSISTENT ASTHMA WITH ACUTE EXACERBATION: ICD-10-CM

## 2021-01-19 DIAGNOSIS — J44.89 ASTHMA WITH COPD: ICD-10-CM

## 2021-01-19 DIAGNOSIS — G47.33 OSA ON CPAP: ICD-10-CM

## 2021-01-19 DIAGNOSIS — R05.9 COUGH: ICD-10-CM

## 2021-01-19 DIAGNOSIS — E66.9 OBESITY, UNSPECIFIED CLASSIFICATION, UNSPECIFIED OBESITY TYPE, UNSPECIFIED WHETHER SERIOUS COMORBIDITY PRESENT: ICD-10-CM

## 2021-01-19 DIAGNOSIS — J30.9 CHRONIC ALLERGIC RHINITIS: ICD-10-CM

## 2021-01-19 PROCEDURE — 3288F FALL RISK ASSESSMENT DOCD: CPT | Mod: CPTII,S$GLB,, | Performed by: INTERNAL MEDICINE

## 2021-01-19 PROCEDURE — 1159F MED LIST DOCD IN RCRD: CPT | Mod: S$GLB,,, | Performed by: INTERNAL MEDICINE

## 2021-01-19 PROCEDURE — 3074F SYST BP LT 130 MM HG: CPT | Mod: CPTII,S$GLB,, | Performed by: INTERNAL MEDICINE

## 2021-01-19 PROCEDURE — 3074F PR MOST RECENT SYSTOLIC BLOOD PRESSURE < 130 MM HG: ICD-10-PCS | Mod: CPTII,S$GLB,, | Performed by: INTERNAL MEDICINE

## 2021-01-19 PROCEDURE — 3008F PR BODY MASS INDEX (BMI) DOCUMENTED: ICD-10-PCS | Mod: CPTII,S$GLB,, | Performed by: INTERNAL MEDICINE

## 2021-01-19 PROCEDURE — 1101F PR PT FALLS ASSESS DOC 0-1 FALLS W/OUT INJ PAST YR: ICD-10-PCS | Mod: CPTII,S$GLB,, | Performed by: INTERNAL MEDICINE

## 2021-01-19 PROCEDURE — 71046 X-RAY EXAM CHEST 2 VIEWS: CPT | Mod: TC

## 2021-01-19 PROCEDURE — 99999 PR PBB SHADOW E&M-EST. PATIENT-LVL III: CPT | Mod: PBBFAC,,, | Performed by: INTERNAL MEDICINE

## 2021-01-19 PROCEDURE — 3078F DIAST BP <80 MM HG: CPT | Mod: CPTII,S$GLB,, | Performed by: INTERNAL MEDICINE

## 2021-01-19 PROCEDURE — 71046 X-RAY EXAM CHEST 2 VIEWS: CPT | Mod: 26,,, | Performed by: RADIOLOGY

## 2021-01-19 PROCEDURE — 3288F PR FALLS RISK ASSESSMENT DOCUMENTED: ICD-10-PCS | Mod: CPTII,S$GLB,, | Performed by: INTERNAL MEDICINE

## 2021-01-19 PROCEDURE — 3008F BODY MASS INDEX DOCD: CPT | Mod: CPTII,S$GLB,, | Performed by: INTERNAL MEDICINE

## 2021-01-19 PROCEDURE — 99215 PR OFFICE/OUTPT VISIT, EST, LEVL V, 40-54 MIN: ICD-10-PCS | Mod: S$GLB,,, | Performed by: INTERNAL MEDICINE

## 2021-01-19 PROCEDURE — 1159F PR MEDICATION LIST DOCUMENTED IN MEDICAL RECORD: ICD-10-PCS | Mod: S$GLB,,, | Performed by: INTERNAL MEDICINE

## 2021-01-19 PROCEDURE — 1101F PT FALLS ASSESS-DOCD LE1/YR: CPT | Mod: CPTII,S$GLB,, | Performed by: INTERNAL MEDICINE

## 2021-01-19 PROCEDURE — 99215 OFFICE O/P EST HI 40 MIN: CPT | Mod: S$GLB,,, | Performed by: INTERNAL MEDICINE

## 2021-01-19 PROCEDURE — 3078F PR MOST RECENT DIASTOLIC BLOOD PRESSURE < 80 MM HG: ICD-10-PCS | Mod: CPTII,S$GLB,, | Performed by: INTERNAL MEDICINE

## 2021-01-19 PROCEDURE — 99999 PR PBB SHADOW E&M-EST. PATIENT-LVL III: ICD-10-PCS | Mod: PBBFAC,,, | Performed by: INTERNAL MEDICINE

## 2021-01-19 PROCEDURE — 71046 XR CHEST PA AND LATERAL: ICD-10-PCS | Mod: 26,,, | Performed by: RADIOLOGY

## 2021-01-19 RX ORDER — ALBUTEROL SULFATE 90 UG/1
2 AEROSOL, METERED RESPIRATORY (INHALATION) EVERY 4 HOURS PRN
Qty: 18 G | Refills: 11 | Status: SHIPPED | OUTPATIENT
Start: 2021-01-19 | End: 2022-04-18 | Stop reason: SDUPTHER

## 2021-01-19 RX ORDER — IPRATROPIUM BROMIDE AND ALBUTEROL SULFATE 2.5; .5 MG/3ML; MG/3ML
3 SOLUTION RESPIRATORY (INHALATION) EVERY 6 HOURS
Qty: 150 VIAL | Refills: 11 | Status: SHIPPED | OUTPATIENT
Start: 2021-01-19 | End: 2022-01-26

## 2021-01-19 RX ORDER — FLUTICASONE PROPIONATE AND SALMETEROL 250; 50 UG/1; UG/1
1 POWDER RESPIRATORY (INHALATION) 2 TIMES DAILY
Qty: 1 EACH | Refills: 11 | Status: SHIPPED | OUTPATIENT
Start: 2021-01-19 | End: 2022-04-18 | Stop reason: SDUPTHER

## 2021-01-19 RX ORDER — PROMETHAZINE HYDROCHLORIDE AND CODEINE PHOSPHATE 6.25; 1 MG/5ML; MG/5ML
5 SOLUTION ORAL NIGHTLY PRN
Qty: 240 ML | Refills: 0 | Status: SHIPPED | OUTPATIENT
Start: 2021-01-19 | End: 2021-03-03 | Stop reason: SDUPTHER

## 2021-01-19 RX ORDER — AZITHROMYCIN 250 MG/1
TABLET, FILM COATED ORAL
Qty: 6 TABLET | Refills: 0 | Status: SHIPPED | OUTPATIENT
Start: 2021-01-19 | End: 2021-04-14 | Stop reason: SDUPTHER

## 2021-01-19 RX ORDER — PREDNISONE 20 MG/1
TABLET ORAL
Qty: 20 TABLET | Refills: 0 | Status: SHIPPED | OUTPATIENT
Start: 2021-01-19 | End: 2021-03-03

## 2021-01-20 ENCOUNTER — TELEPHONE (OUTPATIENT)
Dept: SLEEP MEDICINE | Facility: CLINIC | Age: 71
End: 2021-01-20

## 2021-01-27 ENCOUNTER — OFFICE VISIT (OUTPATIENT)
Dept: UROLOGY | Facility: CLINIC | Age: 71
End: 2021-01-27
Payer: MEDICARE

## 2021-01-27 ENCOUNTER — LAB VISIT (OUTPATIENT)
Dept: LAB | Facility: HOSPITAL | Age: 71
End: 2021-01-27
Attending: UROLOGY
Payer: MEDICARE

## 2021-01-27 VITALS
TEMPERATURE: 98 F | HEART RATE: 68 BPM | BODY MASS INDEX: 27.5 KG/M2 | WEIGHT: 214.31 LBS | HEIGHT: 74 IN | DIASTOLIC BLOOD PRESSURE: 62 MMHG | SYSTOLIC BLOOD PRESSURE: 154 MMHG

## 2021-01-27 DIAGNOSIS — R35.1 NOCTURIA: ICD-10-CM

## 2021-01-27 DIAGNOSIS — Z12.5 PROSTATE CANCER SCREENING: ICD-10-CM

## 2021-01-27 DIAGNOSIS — Z12.5 PROSTATE CANCER SCREENING: Primary | ICD-10-CM

## 2021-01-27 DIAGNOSIS — R35.0 URINARY FREQUENCY: ICD-10-CM

## 2021-01-27 LAB — COMPLEXED PSA SERPL-MCNC: 2.4 NG/ML (ref 0–4)

## 2021-01-27 PROCEDURE — 84153 ASSAY OF PSA TOTAL: CPT

## 2021-01-27 PROCEDURE — 1101F PT FALLS ASSESS-DOCD LE1/YR: CPT | Mod: CPTII,S$GLB,, | Performed by: UROLOGY

## 2021-01-27 PROCEDURE — 3078F DIAST BP <80 MM HG: CPT | Mod: CPTII,S$GLB,, | Performed by: UROLOGY

## 2021-01-27 PROCEDURE — 3288F FALL RISK ASSESSMENT DOCD: CPT | Mod: CPTII,S$GLB,, | Performed by: UROLOGY

## 2021-01-27 PROCEDURE — 99203 PR OFFICE/OUTPT VISIT, NEW, LEVL III, 30-44 MIN: ICD-10-PCS | Mod: S$GLB,,, | Performed by: UROLOGY

## 2021-01-27 PROCEDURE — 3008F BODY MASS INDEX DOCD: CPT | Mod: CPTII,S$GLB,, | Performed by: UROLOGY

## 2021-01-27 PROCEDURE — 3077F PR MOST RECENT SYSTOLIC BLOOD PRESSURE >= 140 MM HG: ICD-10-PCS | Mod: CPTII,S$GLB,, | Performed by: UROLOGY

## 2021-01-27 PROCEDURE — 36415 COLL VENOUS BLD VENIPUNCTURE: CPT

## 2021-01-27 PROCEDURE — 3078F PR MOST RECENT DIASTOLIC BLOOD PRESSURE < 80 MM HG: ICD-10-PCS | Mod: CPTII,S$GLB,, | Performed by: UROLOGY

## 2021-01-27 PROCEDURE — 1159F MED LIST DOCD IN RCRD: CPT | Mod: S$GLB,,, | Performed by: UROLOGY

## 2021-01-27 PROCEDURE — 99203 OFFICE O/P NEW LOW 30 MIN: CPT | Mod: S$GLB,,, | Performed by: UROLOGY

## 2021-01-27 PROCEDURE — 1101F PR PT FALLS ASSESS DOC 0-1 FALLS W/OUT INJ PAST YR: ICD-10-PCS | Mod: CPTII,S$GLB,, | Performed by: UROLOGY

## 2021-01-27 PROCEDURE — 1126F AMNT PAIN NOTED NONE PRSNT: CPT | Mod: S$GLB,,, | Performed by: UROLOGY

## 2021-01-27 PROCEDURE — 3077F SYST BP >= 140 MM HG: CPT | Mod: CPTII,S$GLB,, | Performed by: UROLOGY

## 2021-01-27 PROCEDURE — 99999 PR PBB SHADOW E&M-EST. PATIENT-LVL V: CPT | Mod: PBBFAC,,, | Performed by: UROLOGY

## 2021-01-27 PROCEDURE — 3288F PR FALLS RISK ASSESSMENT DOCUMENTED: ICD-10-PCS | Mod: CPTII,S$GLB,, | Performed by: UROLOGY

## 2021-01-27 PROCEDURE — 1126F PR PAIN SEVERITY QUANTIFIED, NO PAIN PRESENT: ICD-10-PCS | Mod: S$GLB,,, | Performed by: UROLOGY

## 2021-01-27 PROCEDURE — 3008F PR BODY MASS INDEX (BMI) DOCUMENTED: ICD-10-PCS | Mod: CPTII,S$GLB,, | Performed by: UROLOGY

## 2021-01-27 PROCEDURE — 99999 PR PBB SHADOW E&M-EST. PATIENT-LVL V: ICD-10-PCS | Mod: PBBFAC,,, | Performed by: UROLOGY

## 2021-01-27 PROCEDURE — 1159F PR MEDICATION LIST DOCUMENTED IN MEDICAL RECORD: ICD-10-PCS | Mod: S$GLB,,, | Performed by: UROLOGY

## 2021-01-28 ENCOUNTER — PATIENT MESSAGE (OUTPATIENT)
Dept: UROLOGY | Facility: CLINIC | Age: 71
End: 2021-01-28

## 2021-01-28 ENCOUNTER — TELEPHONE (OUTPATIENT)
Dept: UROLOGY | Facility: CLINIC | Age: 71
End: 2021-01-28

## 2021-01-28 ENCOUNTER — TELEPHONE (OUTPATIENT)
Dept: PULMONOLOGY | Facility: CLINIC | Age: 71
End: 2021-01-28

## 2021-02-03 ENCOUNTER — PROCEDURE VISIT (OUTPATIENT)
Dept: SLEEP MEDICINE | Facility: CLINIC | Age: 71
End: 2021-02-03
Payer: MEDICARE

## 2021-02-03 DIAGNOSIS — G47.33 OSA (OBSTRUCTIVE SLEEP APNEA): Primary | ICD-10-CM

## 2021-02-03 DIAGNOSIS — G47.33 OSA ON CPAP: ICD-10-CM

## 2021-02-07 PROCEDURE — 95806 SLEEP STUDY UNATT&RESP EFFT: CPT | Mod: 26,S$GLB,, | Performed by: INTERNAL MEDICINE

## 2021-02-07 PROCEDURE — 95806 PR SLEEP STUDY, UNATTENDED, SIMUL RECORD HR/O2 SAT/RESP FLOW/RESP EFFT: ICD-10-PCS | Mod: 26,S$GLB,, | Performed by: INTERNAL MEDICINE

## 2021-02-14 DIAGNOSIS — G47.33 OSA ON CPAP: Primary | ICD-10-CM

## 2021-03-03 ENCOUNTER — OFFICE VISIT (OUTPATIENT)
Dept: PULMONOLOGY | Facility: CLINIC | Age: 71
End: 2021-03-03
Payer: MEDICARE

## 2021-03-03 VITALS
OXYGEN SATURATION: 99 % | DIASTOLIC BLOOD PRESSURE: 80 MMHG | RESPIRATION RATE: 17 BRPM | BODY MASS INDEX: 27.53 KG/M2 | WEIGHT: 214.5 LBS | SYSTOLIC BLOOD PRESSURE: 122 MMHG | HEIGHT: 74 IN | HEART RATE: 52 BPM

## 2021-03-03 DIAGNOSIS — R05.9 COUGH: ICD-10-CM

## 2021-03-03 DIAGNOSIS — G47.33 OSA (OBSTRUCTIVE SLEEP APNEA): Primary | ICD-10-CM

## 2021-03-03 PROCEDURE — 3288F FALL RISK ASSESSMENT DOCD: CPT | Mod: CPTII,S$GLB,, | Performed by: INTERNAL MEDICINE

## 2021-03-03 PROCEDURE — 1101F PT FALLS ASSESS-DOCD LE1/YR: CPT | Mod: CPTII,S$GLB,, | Performed by: INTERNAL MEDICINE

## 2021-03-03 PROCEDURE — 99999 PR PBB SHADOW E&M-EST. PATIENT-LVL IV: ICD-10-PCS | Mod: PBBFAC,,, | Performed by: INTERNAL MEDICINE

## 2021-03-03 PROCEDURE — 99999 PR PBB SHADOW E&M-EST. PATIENT-LVL IV: CPT | Mod: PBBFAC,,, | Performed by: INTERNAL MEDICINE

## 2021-03-03 PROCEDURE — 3079F PR MOST RECENT DIASTOLIC BLOOD PRESSURE 80-89 MM HG: ICD-10-PCS | Mod: CPTII,S$GLB,, | Performed by: INTERNAL MEDICINE

## 2021-03-03 PROCEDURE — 3079F DIAST BP 80-89 MM HG: CPT | Mod: CPTII,S$GLB,, | Performed by: INTERNAL MEDICINE

## 2021-03-03 PROCEDURE — 3074F SYST BP LT 130 MM HG: CPT | Mod: CPTII,S$GLB,, | Performed by: INTERNAL MEDICINE

## 2021-03-03 PROCEDURE — 1159F PR MEDICATION LIST DOCUMENTED IN MEDICAL RECORD: ICD-10-PCS | Mod: S$GLB,,, | Performed by: INTERNAL MEDICINE

## 2021-03-03 PROCEDURE — 3074F PR MOST RECENT SYSTOLIC BLOOD PRESSURE < 130 MM HG: ICD-10-PCS | Mod: CPTII,S$GLB,, | Performed by: INTERNAL MEDICINE

## 2021-03-03 PROCEDURE — 99214 OFFICE O/P EST MOD 30 MIN: CPT | Mod: S$GLB,,, | Performed by: INTERNAL MEDICINE

## 2021-03-03 PROCEDURE — 1101F PR PT FALLS ASSESS DOC 0-1 FALLS W/OUT INJ PAST YR: ICD-10-PCS | Mod: CPTII,S$GLB,, | Performed by: INTERNAL MEDICINE

## 2021-03-03 PROCEDURE — 3008F BODY MASS INDEX DOCD: CPT | Mod: CPTII,S$GLB,, | Performed by: INTERNAL MEDICINE

## 2021-03-03 PROCEDURE — 3008F PR BODY MASS INDEX (BMI) DOCUMENTED: ICD-10-PCS | Mod: CPTII,S$GLB,, | Performed by: INTERNAL MEDICINE

## 2021-03-03 PROCEDURE — 99214 PR OFFICE/OUTPT VISIT, EST, LEVL IV, 30-39 MIN: ICD-10-PCS | Mod: S$GLB,,, | Performed by: INTERNAL MEDICINE

## 2021-03-03 PROCEDURE — 3288F PR FALLS RISK ASSESSMENT DOCUMENTED: ICD-10-PCS | Mod: CPTII,S$GLB,, | Performed by: INTERNAL MEDICINE

## 2021-03-03 PROCEDURE — 1159F MED LIST DOCD IN RCRD: CPT | Mod: S$GLB,,, | Performed by: INTERNAL MEDICINE

## 2021-03-03 RX ORDER — PROMETHAZINE HYDROCHLORIDE AND CODEINE PHOSPHATE 6.25; 1 MG/5ML; MG/5ML
5 SOLUTION ORAL NIGHTLY PRN
Qty: 240 ML | Refills: 0 | Status: SHIPPED | OUTPATIENT
Start: 2021-03-03 | End: 2021-04-14 | Stop reason: SDUPTHER

## 2021-04-14 ENCOUNTER — OFFICE VISIT (OUTPATIENT)
Dept: PULMONOLOGY | Facility: CLINIC | Age: 71
End: 2021-04-14
Payer: MEDICARE

## 2021-04-14 ENCOUNTER — TELEPHONE (OUTPATIENT)
Dept: PULMONOLOGY | Facility: CLINIC | Age: 71
End: 2021-04-14

## 2021-04-14 DIAGNOSIS — J45.31 MILD PERSISTENT ASTHMA WITH ACUTE EXACERBATION: ICD-10-CM

## 2021-04-14 DIAGNOSIS — R05.9 COUGH: ICD-10-CM

## 2021-04-14 DIAGNOSIS — G47.33 OSA (OBSTRUCTIVE SLEEP APNEA): Primary | ICD-10-CM

## 2021-04-14 PROCEDURE — 99213 OFFICE O/P EST LOW 20 MIN: CPT | Mod: 95,,, | Performed by: INTERNAL MEDICINE

## 2021-04-14 PROCEDURE — 1159F PR MEDICATION LIST DOCUMENTED IN MEDICAL RECORD: ICD-10-PCS | Mod: 95,,, | Performed by: INTERNAL MEDICINE

## 2021-04-14 PROCEDURE — 99213 PR OFFICE/OUTPT VISIT, EST, LEVL III, 20-29 MIN: ICD-10-PCS | Mod: 95,,, | Performed by: INTERNAL MEDICINE

## 2021-04-14 PROCEDURE — 1159F MED LIST DOCD IN RCRD: CPT | Mod: 95,,, | Performed by: INTERNAL MEDICINE

## 2021-04-14 RX ORDER — TIZANIDINE 4 MG/1
4 TABLET ORAL NIGHTLY PRN
COMMUNITY
Start: 2021-03-31 | End: 2021-10-26

## 2021-04-14 RX ORDER — NIFEDIPINE 60 MG/1
60 TABLET, EXTENDED RELEASE ORAL DAILY
COMMUNITY
Start: 2021-03-24 | End: 2024-01-18

## 2021-04-14 RX ORDER — HYDROCODONE BITARTRATE AND ACETAMINOPHEN 10; 325 MG/1; MG/1
1 TABLET ORAL 2 TIMES DAILY PRN
COMMUNITY
Start: 2021-03-31 | End: 2024-01-18

## 2021-04-14 RX ORDER — PROMETHAZINE HYDROCHLORIDE AND CODEINE PHOSPHATE 6.25; 1 MG/5ML; MG/5ML
5 SOLUTION ORAL NIGHTLY PRN
Qty: 240 ML | Refills: 5 | Status: SHIPPED | OUTPATIENT
Start: 2021-04-14 | End: 2021-10-28

## 2021-04-14 RX ORDER — DIGOXIN 250 MCG
0.25 TABLET ORAL DAILY
COMMUNITY
Start: 2021-03-24

## 2021-04-14 RX ORDER — AZITHROMYCIN 250 MG/1
TABLET, FILM COATED ORAL
Qty: 6 TABLET | Refills: 0 | Status: SHIPPED | OUTPATIENT
Start: 2021-04-14 | End: 2021-10-26

## 2021-04-22 ENCOUNTER — TELEPHONE (OUTPATIENT)
Dept: PULMONOLOGY | Facility: CLINIC | Age: 71
End: 2021-04-22

## 2021-10-15 ENCOUNTER — OFFICE VISIT (OUTPATIENT)
Dept: PULMONOLOGY | Facility: CLINIC | Age: 71
End: 2021-10-15
Payer: MEDICARE

## 2021-10-15 VITALS
RESPIRATION RATE: 14 BRPM | WEIGHT: 213.88 LBS | OXYGEN SATURATION: 99 % | HEIGHT: 73 IN | DIASTOLIC BLOOD PRESSURE: 50 MMHG | HEART RATE: 63 BPM | BODY MASS INDEX: 28.34 KG/M2 | SYSTOLIC BLOOD PRESSURE: 100 MMHG

## 2021-10-15 DIAGNOSIS — G25.81 RESTLESS LEG SYNDROME, CONTROLLED: Chronic | ICD-10-CM

## 2021-10-15 DIAGNOSIS — J30.9 CHRONIC ALLERGIC RHINITIS: ICD-10-CM

## 2021-10-15 DIAGNOSIS — J44.89 ASTHMA WITH COPD: ICD-10-CM

## 2021-10-15 DIAGNOSIS — G47.33 OSA ON CPAP: Primary | ICD-10-CM

## 2021-10-15 PROCEDURE — 1101F PR PT FALLS ASSESS DOC 0-1 FALLS W/OUT INJ PAST YR: ICD-10-PCS | Mod: CPTII,S$GLB,, | Performed by: NURSE PRACTITIONER

## 2021-10-15 PROCEDURE — 3074F PR MOST RECENT SYSTOLIC BLOOD PRESSURE < 130 MM HG: ICD-10-PCS | Mod: CPTII,S$GLB,, | Performed by: NURSE PRACTITIONER

## 2021-10-15 PROCEDURE — 99214 OFFICE O/P EST MOD 30 MIN: CPT | Mod: S$GLB,,, | Performed by: NURSE PRACTITIONER

## 2021-10-15 PROCEDURE — 99499 UNLISTED E&M SERVICE: CPT | Mod: S$GLB,,, | Performed by: NURSE PRACTITIONER

## 2021-10-15 PROCEDURE — 99999 PR PBB SHADOW E&M-EST. PATIENT-LVL IV: CPT | Mod: PBBFAC,,, | Performed by: NURSE PRACTITIONER

## 2021-10-15 PROCEDURE — 99499 RISK ADDL DX/OHS AUDIT: ICD-10-PCS | Mod: S$GLB,,, | Performed by: NURSE PRACTITIONER

## 2021-10-15 PROCEDURE — 1159F PR MEDICATION LIST DOCUMENTED IN MEDICAL RECORD: ICD-10-PCS | Mod: CPTII,S$GLB,, | Performed by: NURSE PRACTITIONER

## 2021-10-15 PROCEDURE — 3008F PR BODY MASS INDEX (BMI) DOCUMENTED: ICD-10-PCS | Mod: CPTII,S$GLB,, | Performed by: NURSE PRACTITIONER

## 2021-10-15 PROCEDURE — 3288F PR FALLS RISK ASSESSMENT DOCUMENTED: ICD-10-PCS | Mod: CPTII,S$GLB,, | Performed by: NURSE PRACTITIONER

## 2021-10-15 PROCEDURE — 3008F BODY MASS INDEX DOCD: CPT | Mod: CPTII,S$GLB,, | Performed by: NURSE PRACTITIONER

## 2021-10-15 PROCEDURE — 3078F PR MOST RECENT DIASTOLIC BLOOD PRESSURE < 80 MM HG: ICD-10-PCS | Mod: CPTII,S$GLB,, | Performed by: NURSE PRACTITIONER

## 2021-10-15 PROCEDURE — 1160F RVW MEDS BY RX/DR IN RCRD: CPT | Mod: CPTII,S$GLB,, | Performed by: NURSE PRACTITIONER

## 2021-10-15 PROCEDURE — 99214 PR OFFICE/OUTPT VISIT, EST, LEVL IV, 30-39 MIN: ICD-10-PCS | Mod: S$GLB,,, | Performed by: NURSE PRACTITIONER

## 2021-10-15 PROCEDURE — 99999 PR PBB SHADOW E&M-EST. PATIENT-LVL IV: ICD-10-PCS | Mod: PBBFAC,,, | Performed by: NURSE PRACTITIONER

## 2021-10-15 PROCEDURE — 3074F SYST BP LT 130 MM HG: CPT | Mod: CPTII,S$GLB,, | Performed by: NURSE PRACTITIONER

## 2021-10-15 PROCEDURE — 1101F PT FALLS ASSESS-DOCD LE1/YR: CPT | Mod: CPTII,S$GLB,, | Performed by: NURSE PRACTITIONER

## 2021-10-15 PROCEDURE — 3078F DIAST BP <80 MM HG: CPT | Mod: CPTII,S$GLB,, | Performed by: NURSE PRACTITIONER

## 2021-10-15 PROCEDURE — 3288F FALL RISK ASSESSMENT DOCD: CPT | Mod: CPTII,S$GLB,, | Performed by: NURSE PRACTITIONER

## 2021-10-15 PROCEDURE — 1160F PR REVIEW ALL MEDS BY PRESCRIBER/CLIN PHARMACIST DOCUMENTED: ICD-10-PCS | Mod: CPTII,S$GLB,, | Performed by: NURSE PRACTITIONER

## 2021-10-15 PROCEDURE — 1159F MED LIST DOCD IN RCRD: CPT | Mod: CPTII,S$GLB,, | Performed by: NURSE PRACTITIONER

## 2021-10-15 RX ORDER — MULTIVITAMIN
1 TABLET ORAL DAILY
COMMUNITY
End: 2024-01-18

## 2021-10-15 RX ORDER — SILDENAFIL 100 MG/1
100 TABLET, FILM COATED ORAL DAILY PRN
COMMUNITY
Start: 2021-09-27

## 2021-10-15 RX ORDER — METOPROLOL SUCCINATE 50 MG/1
50 TABLET, EXTENDED RELEASE ORAL 2 TIMES DAILY
COMMUNITY
Start: 2021-08-31

## 2021-10-15 RX ORDER — SERTRALINE HYDROCHLORIDE 50 MG/1
50 TABLET, FILM COATED ORAL DAILY PRN
COMMUNITY
Start: 2021-09-01

## 2021-10-26 ENCOUNTER — OFFICE VISIT (OUTPATIENT)
Dept: FAMILY MEDICINE | Facility: CLINIC | Age: 71
End: 2021-10-26
Payer: MEDICARE

## 2021-10-26 VITALS
HEIGHT: 73 IN | DIASTOLIC BLOOD PRESSURE: 68 MMHG | SYSTOLIC BLOOD PRESSURE: 110 MMHG | TEMPERATURE: 98 F | WEIGHT: 203.5 LBS | OXYGEN SATURATION: 98 % | BODY MASS INDEX: 26.97 KG/M2 | HEART RATE: 80 BPM

## 2021-10-26 DIAGNOSIS — M19.90 OSTEOARTHRITIS, UNSPECIFIED OSTEOARTHRITIS TYPE, UNSPECIFIED SITE: Chronic | ICD-10-CM

## 2021-10-26 DIAGNOSIS — F32.A ANXIETY AND DEPRESSION: ICD-10-CM

## 2021-10-26 DIAGNOSIS — I25.2 HISTORY OF MI (MYOCARDIAL INFARCTION): ICD-10-CM

## 2021-10-26 DIAGNOSIS — F41.9 ANXIETY AND DEPRESSION: ICD-10-CM

## 2021-10-26 DIAGNOSIS — G47.33 OSA ON CPAP: ICD-10-CM

## 2021-10-26 DIAGNOSIS — I10 HYPERTENSION, UNSPECIFIED TYPE: Primary | ICD-10-CM

## 2021-10-26 DIAGNOSIS — J44.89 ASTHMA WITH COPD: ICD-10-CM

## 2021-10-26 PROCEDURE — 99203 PR OFFICE/OUTPT VISIT, NEW, LEVL III, 30-44 MIN: ICD-10-PCS | Mod: S$GLB,,, | Performed by: FAMILY MEDICINE

## 2021-10-26 PROCEDURE — 99999 PR PBB SHADOW E&M-EST. PATIENT-LVL V: ICD-10-PCS | Mod: PBBFAC,,, | Performed by: FAMILY MEDICINE

## 2021-10-26 PROCEDURE — 99499 UNLISTED E&M SERVICE: CPT | Mod: S$GLB,,, | Performed by: FAMILY MEDICINE

## 2021-10-26 PROCEDURE — 99499 RISK ADDL DX/OHS AUDIT: ICD-10-PCS | Mod: S$GLB,,, | Performed by: FAMILY MEDICINE

## 2021-10-26 PROCEDURE — 99203 OFFICE O/P NEW LOW 30 MIN: CPT | Mod: S$GLB,,, | Performed by: FAMILY MEDICINE

## 2021-10-26 PROCEDURE — 99999 PR PBB SHADOW E&M-EST. PATIENT-LVL V: CPT | Mod: PBBFAC,,, | Performed by: FAMILY MEDICINE

## 2021-10-26 RX ORDER — AMLODIPINE BESYLATE 10 MG/1
10 TABLET ORAL DAILY
COMMUNITY

## 2021-10-26 RX ORDER — SILDENAFIL 100 MG/1
100 TABLET, FILM COATED ORAL DAILY PRN
COMMUNITY
End: 2021-10-26 | Stop reason: SDUPTHER

## 2021-10-28 DIAGNOSIS — R05.9 COUGH: ICD-10-CM

## 2021-10-28 RX ORDER — PROMETHAZINE HYDROCHLORIDE AND CODEINE PHOSPHATE 6.25; 1 MG/5ML; MG/5ML
SOLUTION ORAL
Qty: 240 ML | Refills: 5 | Status: SHIPPED | OUTPATIENT
Start: 2021-10-28 | End: 2022-04-18 | Stop reason: ALTCHOICE

## 2021-12-05 PROBLEM — F32.A ANXIETY AND DEPRESSION: Status: ACTIVE | Noted: 2021-12-05

## 2021-12-05 PROBLEM — F41.9 ANXIETY AND DEPRESSION: Status: ACTIVE | Noted: 2021-12-05

## 2022-01-26 ENCOUNTER — OFFICE VISIT (OUTPATIENT)
Dept: FAMILY MEDICINE | Facility: CLINIC | Age: 72
End: 2022-01-26
Payer: MEDICARE

## 2022-01-26 ENCOUNTER — LAB VISIT (OUTPATIENT)
Dept: LAB | Facility: HOSPITAL | Age: 72
End: 2022-01-26
Attending: FAMILY MEDICINE
Payer: MEDICARE

## 2022-01-26 VITALS
BODY MASS INDEX: 28.25 KG/M2 | OXYGEN SATURATION: 98 % | DIASTOLIC BLOOD PRESSURE: 52 MMHG | HEIGHT: 73 IN | SYSTOLIC BLOOD PRESSURE: 134 MMHG | TEMPERATURE: 99 F | HEART RATE: 67 BPM | WEIGHT: 213.19 LBS

## 2022-01-26 DIAGNOSIS — G25.81 RESTLESS LEG SYNDROME, CONTROLLED: ICD-10-CM

## 2022-01-26 DIAGNOSIS — J30.9 CHRONIC ALLERGIC RHINITIS: ICD-10-CM

## 2022-01-26 DIAGNOSIS — K51.40 PSEUDOPOLYPOSIS OF COLON WITHOUT COMPLICATION, UNSPECIFIED PART OF COLON: ICD-10-CM

## 2022-01-26 DIAGNOSIS — K21.9 GASTROESOPHAGEAL REFLUX DISEASE, UNSPECIFIED WHETHER ESOPHAGITIS PRESENT: ICD-10-CM

## 2022-01-26 DIAGNOSIS — F41.9 ANXIETY AND DEPRESSION: ICD-10-CM

## 2022-01-26 DIAGNOSIS — I10 HYPERTENSION, UNSPECIFIED TYPE: Primary | ICD-10-CM

## 2022-01-26 DIAGNOSIS — M19.90 OSTEOARTHRITIS, UNSPECIFIED OSTEOARTHRITIS TYPE, UNSPECIFIED SITE: ICD-10-CM

## 2022-01-26 DIAGNOSIS — G47.33 OSA ON CPAP: ICD-10-CM

## 2022-01-26 DIAGNOSIS — I47.10 SUPRAVENTRICULAR TACHYCARDIA: ICD-10-CM

## 2022-01-26 DIAGNOSIS — F32.A ANXIETY AND DEPRESSION: ICD-10-CM

## 2022-01-26 DIAGNOSIS — E66.3 OVERWEIGHT (BMI 25.0-29.9): ICD-10-CM

## 2022-01-26 DIAGNOSIS — I10 HYPERTENSION, UNSPECIFIED TYPE: ICD-10-CM

## 2022-01-26 DIAGNOSIS — J44.89 ASTHMA WITH COPD: ICD-10-CM

## 2022-01-26 DIAGNOSIS — I73.9 PERIPHERAL VASCULAR DISEASE, UNSPECIFIED: ICD-10-CM

## 2022-01-26 LAB
ALBUMIN SERPL BCP-MCNC: 3.7 G/DL (ref 3.5–5.2)
ALP SERPL-CCNC: 118 U/L (ref 55–135)
ALT SERPL W/O P-5'-P-CCNC: 18 U/L (ref 10–44)
ANION GAP SERPL CALC-SCNC: 9 MMOL/L (ref 8–16)
AST SERPL-CCNC: 23 U/L (ref 10–40)
BILIRUB SERPL-MCNC: 0.4 MG/DL (ref 0.1–1)
BUN SERPL-MCNC: 21 MG/DL (ref 8–23)
CALCIUM SERPL-MCNC: 9.4 MG/DL (ref 8.7–10.5)
CHLORIDE SERPL-SCNC: 107 MMOL/L (ref 95–110)
CO2 SERPL-SCNC: 25 MMOL/L (ref 23–29)
CREAT SERPL-MCNC: 1.1 MG/DL (ref 0.5–1.4)
EST. GFR  (AFRICAN AMERICAN): >60 ML/MIN/1.73 M^2
EST. GFR  (NON AFRICAN AMERICAN): >60 ML/MIN/1.73 M^2
GLUCOSE SERPL-MCNC: 187 MG/DL (ref 70–110)
POTASSIUM SERPL-SCNC: 4.9 MMOL/L (ref 3.5–5.1)
PROT SERPL-MCNC: 7.5 G/DL (ref 6–8.4)
SODIUM SERPL-SCNC: 141 MMOL/L (ref 136–145)

## 2022-01-26 PROCEDURE — 3288F FALL RISK ASSESSMENT DOCD: CPT | Mod: CPTII,S$GLB,, | Performed by: FAMILY MEDICINE

## 2022-01-26 PROCEDURE — 3075F SYST BP GE 130 - 139MM HG: CPT | Mod: CPTII,S$GLB,, | Performed by: FAMILY MEDICINE

## 2022-01-26 PROCEDURE — 99999 PR PBB SHADOW E&M-EST. PATIENT-LVL V: CPT | Mod: PBBFAC,,, | Performed by: FAMILY MEDICINE

## 2022-01-26 PROCEDURE — 3075F PR MOST RECENT SYSTOLIC BLOOD PRESS GE 130-139MM HG: ICD-10-PCS | Mod: CPTII,S$GLB,, | Performed by: FAMILY MEDICINE

## 2022-01-26 PROCEDURE — 1126F PR PAIN SEVERITY QUANTIFIED, NO PAIN PRESENT: ICD-10-PCS | Mod: CPTII,S$GLB,, | Performed by: FAMILY MEDICINE

## 2022-01-26 PROCEDURE — 3078F PR MOST RECENT DIASTOLIC BLOOD PRESSURE < 80 MM HG: ICD-10-PCS | Mod: CPTII,S$GLB,, | Performed by: FAMILY MEDICINE

## 2022-01-26 PROCEDURE — 99214 PR OFFICE/OUTPT VISIT, EST, LEVL IV, 30-39 MIN: ICD-10-PCS | Mod: S$GLB,,, | Performed by: FAMILY MEDICINE

## 2022-01-26 PROCEDURE — 1160F PR REVIEW ALL MEDS BY PRESCRIBER/CLIN PHARMACIST DOCUMENTED: ICD-10-PCS | Mod: CPTII,S$GLB,, | Performed by: FAMILY MEDICINE

## 2022-01-26 PROCEDURE — 3288F PR FALLS RISK ASSESSMENT DOCUMENTED: ICD-10-PCS | Mod: CPTII,S$GLB,, | Performed by: FAMILY MEDICINE

## 2022-01-26 PROCEDURE — 3008F PR BODY MASS INDEX (BMI) DOCUMENTED: ICD-10-PCS | Mod: CPTII,S$GLB,, | Performed by: FAMILY MEDICINE

## 2022-01-26 PROCEDURE — 1126F AMNT PAIN NOTED NONE PRSNT: CPT | Mod: CPTII,S$GLB,, | Performed by: FAMILY MEDICINE

## 2022-01-26 PROCEDURE — 36415 COLL VENOUS BLD VENIPUNCTURE: CPT | Mod: PO | Performed by: FAMILY MEDICINE

## 2022-01-26 PROCEDURE — 1160F RVW MEDS BY RX/DR IN RCRD: CPT | Mod: CPTII,S$GLB,, | Performed by: FAMILY MEDICINE

## 2022-01-26 PROCEDURE — 1159F MED LIST DOCD IN RCRD: CPT | Mod: CPTII,S$GLB,, | Performed by: FAMILY MEDICINE

## 2022-01-26 PROCEDURE — 1159F PR MEDICATION LIST DOCUMENTED IN MEDICAL RECORD: ICD-10-PCS | Mod: CPTII,S$GLB,, | Performed by: FAMILY MEDICINE

## 2022-01-26 PROCEDURE — 80053 COMPREHEN METABOLIC PANEL: CPT | Performed by: FAMILY MEDICINE

## 2022-01-26 PROCEDURE — 3078F DIAST BP <80 MM HG: CPT | Mod: CPTII,S$GLB,, | Performed by: FAMILY MEDICINE

## 2022-01-26 PROCEDURE — 1101F PT FALLS ASSESS-DOCD LE1/YR: CPT | Mod: CPTII,S$GLB,, | Performed by: FAMILY MEDICINE

## 2022-01-26 PROCEDURE — 1101F PR PT FALLS ASSESS DOC 0-1 FALLS W/OUT INJ PAST YR: ICD-10-PCS | Mod: CPTII,S$GLB,, | Performed by: FAMILY MEDICINE

## 2022-01-26 PROCEDURE — 99214 OFFICE O/P EST MOD 30 MIN: CPT | Mod: S$GLB,,, | Performed by: FAMILY MEDICINE

## 2022-01-26 PROCEDURE — 99999 PR PBB SHADOW E&M-EST. PATIENT-LVL V: ICD-10-PCS | Mod: PBBFAC,,, | Performed by: FAMILY MEDICINE

## 2022-01-26 PROCEDURE — 3008F BODY MASS INDEX DOCD: CPT | Mod: CPTII,S$GLB,, | Performed by: FAMILY MEDICINE

## 2022-01-26 RX ORDER — PANTOPRAZOLE SODIUM 40 MG/1
40 TABLET, DELAYED RELEASE ORAL DAILY
Qty: 90 TABLET | Refills: 1 | Status: SHIPPED | OUTPATIENT
Start: 2022-01-26

## 2022-01-26 NOTE — PROGRESS NOTES
Speedy Valle    Chief Complaint   Patient presents with    Follow-up    Hypertension    Weight Check       History of Present Illness:   Mr. Valle comes in today for hypertension and weight check follow-up.  He is not fasting and has not taken medications today.  He states he occasionally performs home blood pressure checks with levels ranging 120-140/79.  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  He states he monitors his diet.    He reports having occasional postnasal drip with cough at night but states he takes promethazine with codeine for cough with help.  He also reports having shortness of breath if walking up stairs.    He reports having chronic urine frequency.    Otherwise, he denies having fever, chills, fatigue, appetite changes; wheezing; chest pain, palpitations, leg swelling; other sinus symptoms; dominal pain, nausea, vomiting, diarrhea, constipation; other unusual urinary symptoms; polydipsia, polyphagia, polyuria, hot or cold intolerance; headache, numbness; anxiety, depression, homicidal or suicidal thoughts.      He saw Dr. Monique, cardiologist, on December 4, 2021 for surveillance of hypertension, hyperlipidemia, PAD, atherosclerosis of coronary artery disease, ANDRZEJ, supraventricular tachycardia.  He saw NP Elizabeth Lejeune with Pulmonary on October 15, 2021 for ANDRZEJ on CPAP, asthma with COPD, chronic allergic rhinitis, and restless leg syndrome (Controlled) with 1-year follow-up advised. He also follows with Dr. Alfred Bernardo, Pulmonologist, with last visit on April 14, 2021.  He follows with Dr. Cheko Dickens, pain management specialist, every 2 months for surveillance osteoarthritis of shoulders, low back, and knees with last visit with LAURIE Montoya on October 28, 2021. He states he is prescribed Norco for pain but states he occasionally smokes marijuana for pain relief.                 Current Outpatient Medications   Medication  Sig    albuterol (VENTOLIN HFA) 90 mcg/actuation inhaler Inhale 2 puffs into the lungs every 4 (four) hours as needed for Shortness of Breath.    amLODIPine (NORVASC) 10 MG tablet Take 10 mg by mouth once daily.    aspirin 81 mg Cap Take by mouth daily as needed.    atorvastatin (LIPITOR) 20 MG tablet Take 20 mg by mouth once daily.    clopidogrel (PLAVIX) 75 mg tablet Take 75 mg by mouth once daily.    digoxin (LANOXIN) 250 mcg tablet Take 0.25 mg by mouth once daily.    fluticasone-salmeterol diskus inhaler 250-50 mcg Inhale 1 puff into the lungs 2 (two) times daily. Wash out mouth after use.    gabapentin (NEURONTIN) 300 MG capsule Take 300 mg by mouth 3 (three) times daily.     HYDROcodone-acetaminophen (NORCO)  mg per tablet Take 1 tablet by mouth 2 (two) times daily as needed.    inhalation device (VORTEX HOLDING CHAMBER) Use as directed for inhalation.    metoprolol succinate (TOPROL-XL) 50 MG 24 hr tablet Take 50 mg by mouth 2 (two) times daily.    multivitamin with folic acid 400 mcg Tab Take 1 tablet by mouth once daily.    NIFEdipine (PROCARDIA-XL) 60 MG (OSM) 24 hr tablet Take 60 mg by mouth once daily.    pantoprazole (PROTONIX) 40 MG tablet Take 40 mg by mouth once daily.    promethazine-codeine 6.25-10 mg/5 ml (PHENERGAN WITH CODEINE) 6.25-10 mg/5 mL syrup TAKE 1 teaspoonful (5ml) BY MOUTH NIGHTLY AS NEEDED FOR COUGH    ranolazine (RANEXA) 500 MG Tb12 Take 500 mg by mouth 2 (two) times daily.    sertraline (ZOLOFT) 50 MG tablet Take 50 mg by mouth daily as needed.    sildenafiL (VIAGRA) 100 MG tablet Take 100 mg by mouth daily as needed.       Review of Systems   Constitutional: Negative for activity change, appetite change, chills, fatigue and fever.        Weight 92.3 kg (203 lb 7.8 oz)) at October 26, 2021 visit.   HENT: Positive for postnasal drip. Negative for congestion, rhinorrhea, sinus pressure, sinus pain, sneezing and sore throat.    Respiratory: Positive for cough  and shortness of breath. Negative for wheezing.         See history of present illness.   Cardiovascular: Negative for chest pain, palpitations and leg swelling.        See history of present illness.   Gastrointestinal: Negative for abdominal pain, constipation, diarrhea, nausea and vomiting.   Endocrine: Negative for cold intolerance, heat intolerance, polydipsia, polyphagia and polyuria.   Genitourinary: Positive for frequency (Chronic per patient.). Negative for difficulty urinating.   Musculoskeletal: Positive for arthralgias, back pain and myalgias.        See history of present illness.   Neurological: Negative for numbness and headaches.   Psychiatric/Behavioral: Negative for dysphoric mood and suicidal ideas. The patient is not nervous/anxious.         Negative for homicidal ideas.       Objective:  Physical Exam  Vitals reviewed.   Constitutional:       General: He is not in acute distress.     Appearance: Normal appearance. He is normal weight. He is not ill-appearing or toxic-appearing.      Comments: Pleasant.   Cardiovascular:      Rate and Rhythm: Normal rate and regular rhythm.      Pulses: Normal pulses.      Heart sounds: Normal heart sounds.   Pulmonary:      Effort: Pulmonary effort is normal.      Breath sounds: Normal breath sounds. No wheezing.   Chest:      Chest wall: No tenderness.   Abdominal:      General: Bowel sounds are normal. There is no distension.      Palpations: There is no mass.      Tenderness: There is no abdominal tenderness. There is no guarding.   Musculoskeletal:         General: No swelling or tenderness. Normal range of motion.      Cervical back: Normal range of motion. No rigidity or tenderness.      Comments: He is ambulatory without problems.   Neurological:      General: No focal deficit present.      Mental Status: He is alert and oriented to person, place, and time.   Psychiatric:         Mood and Affect: Mood normal.         Behavior: Behavior normal.          Thought Content: Thought content normal.         Judgment: Judgment normal.         ASSESSMENT:  1. Hypertension, unspecified type    2. Supraventricular tachycardia    3. Asthma with COPD    4. Osteoarthritis, unspecified osteoarthritis type, unspecified site    5. Peripheral vascular disease, unspecified    6. ANDRZEJ on CPAP    7. Restless leg syndrome, controlled    8. Chronic allergic rhinitis    9. Anxiety and depression    10. Gastroesophageal reflux disease, unspecified whether esophagitis present    11. Pseudopolyposis of colon without complication, unspecified part of colon    12. Overweight (BMI 25.0-29.9)        PLAN:  Speedy was seen today for follow-up, hypertension and weight check.    Diagnoses and all orders for this visit:    Hypertension, unspecified type  -     Comprehensive Metabolic Panel; Future    Supraventricular tachycardia    Asthma with COPD    Osteoarthritis, unspecified osteoarthritis type, unspecified site    Peripheral vascular disease, unspecified    ANDRZEJ on CPAP    Restless leg syndrome, controlled    Chronic allergic rhinitis    Anxiety and depression    Gastroesophageal reflux disease, unspecified whether esophagitis present  -     pantoprazole (PROTONIX) 40 MG tablet; Take 1 tablet (40 mg total) by mouth once daily.    Pseudopolyposis of colon without complication, unspecified part of colon    Overweight (BMI 25.0-29.9)       Patient advised to call for results.  Continue current medications, follow low sodium, low cholesterol, low carb diet, daily walks.  Prescription refill as noted above.  Keep follow up with specialists.  Follow up in about 6 months (around 7/26/2022) for physical.

## 2022-01-30 DIAGNOSIS — R73.09 ABNORMAL NON-FASTING GLUCOSE: Primary | ICD-10-CM

## 2022-01-30 PROBLEM — I47.10 SUPRAVENTRICULAR TACHYCARDIA: Status: ACTIVE | Noted: 2022-01-30

## 2022-01-30 PROBLEM — E66.3 OVERWEIGHT (BMI 25.0-29.9): Status: ACTIVE | Noted: 2022-01-30

## 2022-01-30 PROBLEM — I73.9 PERIPHERAL VASCULAR DISEASE, UNSPECIFIED: Status: ACTIVE | Noted: 2022-01-30

## 2022-01-30 PROBLEM — K51.40 PSEUDOPOLYPOSIS OF COLON WITHOUT COMPLICATION, UNSPECIFIED PART OF COLON: Status: ACTIVE | Noted: 2022-01-30

## 2022-02-23 RX ORDER — RANOLAZINE 500 MG/1
TABLET, EXTENDED RELEASE ORAL
Qty: 180 TABLET | Refills: 0 | OUTPATIENT
Start: 2022-02-23

## 2022-02-23 NOTE — TELEPHONE ENCOUNTER
LV: 01/26/2022    LAV: None     UPCOMING APPT.: 07/29/2022     Pt Request: ranolazine (RANEXA) 500 MG Tb12    Last Filled: 09/23/2015

## 2022-04-05 ENCOUNTER — TELEPHONE (OUTPATIENT)
Dept: ADMINISTRATIVE | Facility: HOSPITAL | Age: 72
End: 2022-04-05
Payer: MEDICARE

## 2022-04-14 ENCOUNTER — PATIENT OUTREACH (OUTPATIENT)
Dept: ADMINISTRATIVE | Facility: OTHER | Age: 72
End: 2022-04-14
Payer: MEDICARE

## 2022-04-15 NOTE — PROGRESS NOTES
Health Maintenance Due   Topic Date Due    Hepatitis C Screening  Never done    Abdominal Aortic Aneurysm Screening  Never done    Pneumococcal Vaccines (Age 65+) (2 - PCV) 11/14/2018    Shingles Vaccine (3 of 3) 08/19/2019     Updates were requested from care everywhere.  Chart was reviewed for overdue Proactive [facility] Encounters ([facility]) topics (CRS, Breast Cancer Screening, Eye exam)  Health Maintenance has been updated.  [place] immunization registry triggered.  Immunizations were reconciled.

## 2022-04-18 ENCOUNTER — OFFICE VISIT (OUTPATIENT)
Dept: PULMONOLOGY | Facility: CLINIC | Age: 72
End: 2022-04-18
Payer: MEDICARE

## 2022-04-18 ENCOUNTER — HOSPITAL ENCOUNTER (OUTPATIENT)
Dept: RADIOLOGY | Facility: HOSPITAL | Age: 72
Discharge: HOME | End: 2022-04-18
Attending: INTERNAL MEDICINE
Payer: MEDICARE

## 2022-04-18 ENCOUNTER — PATIENT MESSAGE (OUTPATIENT)
Dept: PULMONOLOGY | Facility: CLINIC | Age: 72
End: 2022-04-18

## 2022-04-18 VITALS
DIASTOLIC BLOOD PRESSURE: 80 MMHG | HEIGHT: 73 IN | HEART RATE: 59 BPM | WEIGHT: 211.88 LBS | OXYGEN SATURATION: 99 % | SYSTOLIC BLOOD PRESSURE: 112 MMHG | RESPIRATION RATE: 17 BRPM | BODY MASS INDEX: 28.08 KG/M2

## 2022-04-18 DIAGNOSIS — J45.31 MILD PERSISTENT ASTHMA WITH ACUTE EXACERBATION: ICD-10-CM

## 2022-04-18 DIAGNOSIS — J45.20 MILD INTERMITTENT ASTHMA WITHOUT COMPLICATION: ICD-10-CM

## 2022-04-18 DIAGNOSIS — R05.9 COUGH: ICD-10-CM

## 2022-04-18 DIAGNOSIS — J30.89 SEASONAL ALLERGIC RHINITIS DUE TO OTHER ALLERGIC TRIGGER: ICD-10-CM

## 2022-04-18 DIAGNOSIS — J30.89 SEASONAL ALLERGIC RHINITIS DUE TO OTHER ALLERGIC TRIGGER: Primary | ICD-10-CM

## 2022-04-18 DIAGNOSIS — J44.89 ASTHMA WITH COPD: ICD-10-CM

## 2022-04-18 DIAGNOSIS — G47.33 OSA ON CPAP: ICD-10-CM

## 2022-04-18 DIAGNOSIS — I10 HYPERTENSION, UNSPECIFIED TYPE: Chronic | ICD-10-CM

## 2022-04-18 PROBLEM — J30.2 SEASONAL ALLERGIC RHINITIS: Status: ACTIVE | Noted: 2017-09-29

## 2022-04-18 PROCEDURE — 70220 X-RAY EXAM OF SINUSES: CPT | Mod: TC

## 2022-04-18 PROCEDURE — 1160F PR REVIEW ALL MEDS BY PRESCRIBER/CLIN PHARMACIST DOCUMENTED: ICD-10-PCS | Mod: CPTII,S$GLB,, | Performed by: INTERNAL MEDICINE

## 2022-04-18 PROCEDURE — 1100F PR PT FALLS ASSESS DOC 2+ FALLS/FALL W/INJURY/YR: ICD-10-PCS | Mod: CPTII,S$GLB,, | Performed by: INTERNAL MEDICINE

## 2022-04-18 PROCEDURE — 70220 X-RAY EXAM OF SINUSES: CPT | Mod: 26,,, | Performed by: RADIOLOGY

## 2022-04-18 PROCEDURE — 99215 OFFICE O/P EST HI 40 MIN: CPT | Mod: S$GLB,,, | Performed by: INTERNAL MEDICINE

## 2022-04-18 PROCEDURE — 1100F PTFALLS ASSESS-DOCD GE2>/YR: CPT | Mod: CPTII,S$GLB,, | Performed by: INTERNAL MEDICINE

## 2022-04-18 PROCEDURE — 1159F MED LIST DOCD IN RCRD: CPT | Mod: CPTII,S$GLB,, | Performed by: INTERNAL MEDICINE

## 2022-04-18 PROCEDURE — 3288F PR FALLS RISK ASSESSMENT DOCUMENTED: ICD-10-PCS | Mod: CPTII,S$GLB,, | Performed by: INTERNAL MEDICINE

## 2022-04-18 PROCEDURE — 3008F PR BODY MASS INDEX (BMI) DOCUMENTED: ICD-10-PCS | Mod: CPTII,S$GLB,, | Performed by: INTERNAL MEDICINE

## 2022-04-18 PROCEDURE — 3008F BODY MASS INDEX DOCD: CPT | Mod: CPTII,S$GLB,, | Performed by: INTERNAL MEDICINE

## 2022-04-18 PROCEDURE — 99999 PR PBB SHADOW E&M-EST. PATIENT-LVL V: ICD-10-PCS | Mod: PBBFAC,,, | Performed by: INTERNAL MEDICINE

## 2022-04-18 PROCEDURE — 70220 XR SINUSES MIN 3 VIEWS: ICD-10-PCS | Mod: 26,,, | Performed by: RADIOLOGY

## 2022-04-18 PROCEDURE — 99215 PR OFFICE/OUTPT VISIT, EST, LEVL V, 40-54 MIN: ICD-10-PCS | Mod: S$GLB,,, | Performed by: INTERNAL MEDICINE

## 2022-04-18 PROCEDURE — 1159F PR MEDICATION LIST DOCUMENTED IN MEDICAL RECORD: ICD-10-PCS | Mod: CPTII,S$GLB,, | Performed by: INTERNAL MEDICINE

## 2022-04-18 PROCEDURE — 3288F FALL RISK ASSESSMENT DOCD: CPT | Mod: CPTII,S$GLB,, | Performed by: INTERNAL MEDICINE

## 2022-04-18 PROCEDURE — 3079F PR MOST RECENT DIASTOLIC BLOOD PRESSURE 80-89 MM HG: ICD-10-PCS | Mod: CPTII,S$GLB,, | Performed by: INTERNAL MEDICINE

## 2022-04-18 PROCEDURE — 3074F PR MOST RECENT SYSTOLIC BLOOD PRESSURE < 130 MM HG: ICD-10-PCS | Mod: CPTII,S$GLB,, | Performed by: INTERNAL MEDICINE

## 2022-04-18 PROCEDURE — 3079F DIAST BP 80-89 MM HG: CPT | Mod: CPTII,S$GLB,, | Performed by: INTERNAL MEDICINE

## 2022-04-18 PROCEDURE — 3074F SYST BP LT 130 MM HG: CPT | Mod: CPTII,S$GLB,, | Performed by: INTERNAL MEDICINE

## 2022-04-18 PROCEDURE — 1160F RVW MEDS BY RX/DR IN RCRD: CPT | Mod: CPTII,S$GLB,, | Performed by: INTERNAL MEDICINE

## 2022-04-18 PROCEDURE — 99999 PR PBB SHADOW E&M-EST. PATIENT-LVL V: CPT | Mod: PBBFAC,,, | Performed by: INTERNAL MEDICINE

## 2022-04-18 RX ORDER — ALBUTEROL SULFATE 90 UG/1
2 AEROSOL, METERED RESPIRATORY (INHALATION) EVERY 4 HOURS PRN
Qty: 18 G | Refills: 11 | Status: SHIPPED | OUTPATIENT
Start: 2022-04-18 | End: 2024-01-18 | Stop reason: SDUPTHER

## 2022-04-18 RX ORDER — FLUTICASONE PROPIONATE 50 MCG
2 SPRAY, SUSPENSION (ML) NASAL DAILY
Qty: 16 G | Refills: 11 | Status: SHIPPED | OUTPATIENT
Start: 2022-04-18 | End: 2024-01-18

## 2022-04-18 RX ORDER — PROMETHAZINE HYDROCHLORIDE AND DEXTROMETHORPHAN HYDROBROMIDE 6.25; 15 MG/5ML; MG/5ML
5 SYRUP ORAL EVERY 6 HOURS PRN
Qty: 240 ML | Refills: 5 | Status: SHIPPED | OUTPATIENT
Start: 2022-04-18 | End: 2023-01-10 | Stop reason: SDUPTHER

## 2022-04-18 RX ORDER — FLUTICASONE PROPIONATE AND SALMETEROL 250; 50 UG/1; UG/1
1 POWDER RESPIRATORY (INHALATION) 2 TIMES DAILY
Qty: 1 EACH | Refills: 11 | Status: SHIPPED | OUTPATIENT
Start: 2022-04-18 | End: 2023-06-15 | Stop reason: ALTCHOICE

## 2022-04-18 RX ORDER — DOXYCYCLINE 100 MG/1
100 CAPSULE ORAL EVERY 12 HOURS
Qty: 20 CAPSULE | Refills: 1 | Status: SHIPPED | OUTPATIENT
Start: 2022-04-18

## 2022-04-18 RX ORDER — METHYLPREDNISOLONE 4 MG/1
TABLET ORAL
Qty: 1 EACH | Refills: 0 | Status: SHIPPED | OUTPATIENT
Start: 2022-04-18 | End: 2023-06-15

## 2022-04-18 NOTE — PATIENT INSTRUCTIONS
Please read Warning before using.    USE ONLY AS DIRECTED, IF SYMPTOMS PERSIST SEE YOUR DOCTOR/HEALTHCARE PROFESSIONAL. ALWAYS READ THE LABEL. IMPORTANT: NeilMed® SINUS RINSE Mixture Packets should be used with NeilMed® 240 mL (8 fl oz) NASAFLO® to achieve the best results. You may use NeilMed® packets with other irrigation devices, as long as you mix with the correct volume of water. Our recommendation is to replace Neti Pot every three months.  Step 1  Please wash your hands and rinse the device. Fill the NASAFLO® with 240 mL (8 fl oz) of lukewarm distilled, filtered or previously boiled water. Please do not use tap or faucet water to dissolve the mixture unless it has been previously boiled and cooled down. You may warm the water in a microwave, but we recommend that you warm it in increments of 5 to 10 seconds to avoid overheating, damaging the device or scalding your nasal passage. Step 2  Cut the SINUS RINSE mixture packet at the corner and pour contents into the pot. Tighten the lid on the device securely. Place one finger over the hole of the cap and shake the device gently to dissolve the mixture. Step 3  Standing in front of a sink, bend forward to your comfort level and tilt your head to one side. Keeping your mouth open, and without holding your breath, apply the tip of the device snugly against your nasal passage and ALLOW THE SOLUTION TO GENTLY FLOW until the solution starts draining from the opposite nasal passage. Use roughly half the solution in the NASAFLO® (120 mL / 4 fl oz).  It should not enter your mouth unless you are tilting your head backwards. To adjust or stop the flow, you may place your finger over the hole of the cap, and depending on the seal, you may be able to control the flow. Step 4  Blow your nose very gently, without pinching nose completely to avoid pressure on eardrums. If tolerable, sniff in gently any residual solution remaining in the nasal passage once or twice, because  this may clean out the posterior nasopharyngeal area, which is the area at the back of your nasal passage. At times, some solution will reach the back of your throat, so please spit it out.  For NASAFLO® users, to help drain any residual solution, blow your nose gently while tilting your head forward and to the same side of the nasal passage you just rinsed. Step 5  Now repeat steps 3 & 4 on your other nasal passage.  If there is any solution left over, please discard it. We recommend you make a fresh solution each time you rinse. Rinse once or twice daily OR as directed by your physician. Saline is considered safe.  The [place] FDA is recommending that common cough and cold over the counter medicines not be given to babies and toddlers, and that antihistamines should not be given to children under age 6. NeilMed® NASAFLO®: Please clean with soap & water and let air dry Please read Warning before using.    Our recommendation is to replace Neti Pot every three months.    Follow up with Nurse Practitioner.

## 2022-04-18 NOTE — PROGRESS NOTES
Subjective:     Patient ID: Speedy Valle is a 72 y.o. male.    Chief Complaint:      HPI   Sinus Pain  Patient complains of clear rhinorrhea, congestion, headaches, nasal congestion, post nasal drip and sinus pressure. Onset of symptoms was 1 - 2 year ago. Symptoms have worsened since that time. He is drinking plenty of fluids.  Past history is significant for asthma. Patient is former smoker, quit many years ago.    He has a  history of asthma , has not been taking any controller medications regularly, not taking nebulizer solution occasionally takes albuterol c/o cough every night and requests cough medication. Cough largely related to postnasal drip  Additionally has a history of Obstructive Sleep Apnea   Constant cough at night  - keps him up at night  Noncompliant with jet nebs  1 year follow up on Asthma with review of chest xray, no recent spirometry (COVID 19)- prior studies were normal . Chest xray is clear.      Asthma Follow-up  The patient has previously been evaluated here for asthma and presents for an asthma follow-up. The patient is currently having symptoms / an exacerbation. Current symptoms include dyspnea, non-productive cough and wheezing. Symptoms have been present since several months ago and have been unchanged. He denies dyspnea, non-productive cough and wheezing. Associated symptoms include shortness of breath.  This episode appears to have been triggered by no identifiable factor. Treatments tried for the current exacerbation include short-acting inhaled beta-adrenergic agonists, which have provided some relief of symptoms. The patient has been having similar episodes for approximately 10 years.     Current Disease Severity  The patient is having daytime symptoms daily. The patient is having daytime symptoms often 7 times per week. The patient is using short-acting beta agonists for symptom control daily. He has exacerbations requiring oral systemic corticosteroids 1 times per year.  Current limitations in activity from asthma: none. Number of days of school or work missed in the last month: not applicable. Number of urgent/emergent visit in last year: 0.  The patient is not using a spacer with MDIs. His best peak flow rate is na. He is not monitoring peak flow rates at home.     Sleep Apnea  He presents for a sleep evaluation. He complains of snorting, decreased concentration, excessive daytime sleepiness, falling asleep while reading, watching television, sinus problems, congested nose, difficulty falling asleep once awakened, feels sleepy during the day, take naps during the day.  Symptoms began 10 years ago, gradually worsening since that time.  He goes to sleep at 12 weekdays and  weekends. He awakens 5 weekdays and  weekends. He falls asleep in 10 minutes.  Collar size na. He denies knees buckling with laughing, completely or partially paralyzed while falling asleep or waking up. Previous evaluation and treatment has included PSG and PSG with C PAP titration.    Obstructive Sleep Apnea on Continuous Positive Airway Pressure:  Patient is using CPAP as prescribed and benefiting from therapy. Patient has complaints of none    Speedy Valle  2022 - 2022  Patient ID: [id number]  : 1950  Age: 72 years  [facility]  [address] [facility], [id number]  Compliance Report  Compliance  Payor Standard  Usage 2022 - 2022  Usage days 17/30 days (57%)  >= 4 hours 17 days (57%)  < 4 hours 0 days (0%)  Usage hours 139 hours 59 minutes  Average usage (total days) 4 hours 40 minutes  Average usage (days used) 8 hours 14 minutes  Median usage (days used) 8 hours 39 minutes  Total used hours (value since last reset - 2022) 1,327 hours  AirSense 10 AutoSet  Serial number [id number]  Mode AutoSet  Min Pressure 6 cmH2O  Max Pressure 20 cmH2O  EPR Fulltime  EPR level 3  Response Standard  Therapy  Pressure - cmH2O Median: 8.0 95th percentile: 12.6 Maximum:  14.6  Leaks - L/min Median: 0.6 95th percentile: 3.5 Maximum: 57.7  Events per hour AI: 1.8 HI: 0.4 AHI: 2.2  Apnea Index Central: 0.9 Obstructive: 0.7 Unknown: 0.2  RERA Index 0.2  Cheyne-Garcia respiration (average duration per night) 1 minutes (0%)  Usage - hours  Printed on      Past Medical History:   Diagnosis Date    Allergic rhinitis     Anxiety with depression     Arthritis     Asthma with COPD     Benign colon polyp     Coronary artery disease     ED (erectile dysfunction)     GERD (gastroesophageal reflux disease)     Hyperlipidemia     Hypertension     MI (myocardial infarction) 2012    ANDRZEJ on CPAP     PVD (peripheral vascular disease)     RLS (restless legs syndrome)      Past Surgical History:   Procedure Laterality Date    CARDIAC CATHETERIZATION  2010    WITH CORONARY STENTS    cardiac tracer      right hip replacement       Review of patient's allergies indicates:  No Known Allergies  Current Outpatient Medications on File Prior to Visit   Medication Sig Dispense Refill    amLODIPine (NORVASC) 10 MG tablet Take 10 mg by mouth once daily.      aspirin 81 mg Cap Take by mouth daily as needed.      atorvastatin (LIPITOR) 20 MG tablet Take 20 mg by mouth once daily.  3    clopidogrel (PLAVIX) 75 mg tablet Take 75 mg by mouth once daily.  1    digoxin (LANOXIN) 250 mcg tablet Take 0.25 mg by mouth once daily.      gabapentin (NEURONTIN) 300 MG capsule Take 300 mg by mouth 3 (three) times daily.       HYDROcodone-acetaminophen (NORCO)  mg per tablet Take 1 tablet by mouth 2 (two) times daily as needed.      inhalation device (VORTEX HOLDING CHAMBER) Use as directed for inhalation. 1 Device prn    metoprolol succinate (TOPROL-XL) 50 MG 24 hr tablet Take 50 mg by mouth 2 (two) times daily.      multivitamin with folic acid 400 mcg Tab Take 1 tablet by mouth once daily.      NIFEdipine (PROCARDIA-XL) 60 MG (OSM) 24 hr tablet Take 60 mg by mouth once daily.      pantoprazole  (PROTONIX) 40 MG tablet Take 1 tablet (40 mg total) by mouth once daily. 90 tablet 1    ranolazine (RANEXA) 500 MG Tb12 Take 500 mg by mouth 2 (two) times daily.      sertraline (ZOLOFT) 50 MG tablet Take 50 mg by mouth daily as needed.      sildenafiL (VIAGRA) 100 MG tablet Take 100 mg by mouth daily as needed.      [DISCONTINUED] albuterol (VENTOLIN HFA) 90 mcg/actuation inhaler Inhale 2 puffs into the lungs every 4 (four) hours as needed for Shortness of Breath. 18 g 11    [DISCONTINUED] promethazine-codeine 6.25-10 mg/5 ml (PHENERGAN WITH CODEINE) 6.25-10 mg/5 mL syrup TAKE 1 teaspoonful (5ml) BY MOUTH NIGHTLY AS NEEDED FOR COUGH 240 mL 5    [DISCONTINUED] fluticasone-salmeterol diskus inhaler 250-50 mcg Inhale 1 puff into the lungs 2 (two) times daily. Wash out mouth after use. 1 each 11     No current facility-administered medications on file prior to visit.     Social History     Socioeconomic History    Marital status:    Occupational History    Occupation: Retired   Tobacco Use    Smoking status: Former Smoker     Packs/day: 1.50     Years: 14.00     Pack years: 21.00     Types: Cigarettes     Quit date: 1982     Years since quittin.5    Smokeless tobacco: Former User     Types: Snuff     Quit date: 1996   Substance and Sexual Activity    Alcohol use: No     Alcohol/week: 0.0 standard drinks    Drug use: Yes     Types: Marijuana     Comment: every now and then    Sexual activity: Yes     Partners: Female     Social Determinants of Health     Physical Activity: Unknown    Days of Exercise per Week: 7 days     Family History   Problem Relation Age of Onset    Hypertension Mother     Stroke Mother     No Known Problems Daughter     No Known Problems Daughter     No Known Problems Daughter     Bipolar disorder Son        Review of Systems   Constitutional: Positive for fatigue. Negative for fever.   HENT: Positive for postnasal drip, rhinorrhea and congestion.    Eyes:  "Negative for redness and itching.   Respiratory: Positive for apnea, cough, sputum production, shortness of breath, dyspnea on extertion, use of rescue inhaler and Paroxysmal Nocturnal Dyspnea.    Cardiovascular: Negative for chest pain, palpitations and leg swelling.   Genitourinary: Negative for difficulty urinating and hematuria.   Endocrine: Negative for cold intolerance and heat intolerance.    Musculoskeletal: Positive for back pain.   Skin: Negative for rash.   Gastrointestinal: Negative for nausea and abdominal pain.   Neurological: Negative for dizziness, syncope, weakness and light-headedness.   Hematological: Negative for adenopathy. Does not bruise/bleed easily.   Psychiatric/Behavioral: Negative for sleep disturbance. The patient is not nervous/anxious.        Objective:      /80   Pulse (!) 59   Resp 17   Ht 6' 1" (1.854 m)   Wt 96.1 kg (211 lb 13.8 oz)   SpO2 99%   BMI 27.95 kg/m²   Physical Exam  Vitals and nursing note reviewed.   Constitutional:       Appearance: He is well-developed.   HENT:      Head: Normocephalic and atraumatic.   Eyes:      Conjunctiva/sclera: Conjunctivae normal.      Pupils: Pupils are equal, round, and reactive to light.   Neck:      Thyroid: No thyromegaly.      Vascular: No JVD.      Trachea: No tracheal deviation.   Cardiovascular:      Rate and Rhythm: Normal rate and regular rhythm.      Heart sounds: No murmur heard.  Pulmonary:      Breath sounds: Examination of the right-lower field reveals wheezing. Examination of the left-lower field reveals wheezing. Decreased breath sounds and wheezing present. No rhonchi or rales.   Abdominal:      General: Bowel sounds are normal.      Palpations: Abdomen is soft.   Musculoskeletal:         General: No tenderness. Normal range of motion.      Cervical back: Neck supple.   Lymphadenopathy:      Cervical: No cervical adenopathy.   Skin:     General: Skin is warm and dry.   Neurological:      Mental Status: He is " alert and oriented to person, place, and time.       Personal Diagnostic Review  Chest x-ray: Stabel  X-Ray Sinuses Min 3 Views  Narrative: EXAMINATION:  XR SINUSES MIN 3 VIEWS    CLINICAL HISTORY:  exclude sinusitis;  Cough, unspecified    TECHNIQUE:  AP, lateral, and Hdz views of the paranasal sinuses were performed    COMPARISON:  None.    FINDINGS:  Leftward nasal septal deviation.  Paranasal sinuses and mastoid air cells clear.  No acute osseous abnormality.  Poor dentition noted.  Impression: No significant sinus disease.    Electronically signed by: Lance Frederick MD  Date:    04/18/2022  Time:    11:07          Procedures  Eris Arboleda MD (Physician)   Pulmonary Disease  Procedure Orders   1. Home Sleep Studies [[id number]] ordered by Alfred Bernardo MD   Pre-procedure Diagnoses   1. ANDRZEJ on CPAP [G47.33, Z99.89]   Post-procedure Diagnoses   1. ANDRZEJ on CPAP [G47.33, Z99.89]      Home Sleep Studies     Date/Time: 2/3/2021 8:00 AM  Performed by: Eris Arboleda MD  Authorized by: Alfred Bernardo MD        2 night study  SEVERE OBSTRUCTIVE SLEEP APNEA with overall AHI 35.9/hr ( 219 events): night #2  Oxygen desaturation: 87 %. SpO2 between 90% to 94% for 37 min.  Patient snored 72% time above 50 .  Heart rate range: 55 bpm - 92 bpm  REC's:  CPAP titration  Therapy with APAP at 6-20 cm WP using mask of choice with heated humidification is an option            Pulmonary Studies Review 4/18/2022   SpO2 99   Height 73   Weight 3389.79   BMI (Calculated) 28   Predicted Distance 344.12   Predicted Distance Meters (Calculated) 573.94       X-Ray Sinuses Min 3 Views  Narrative: EXAMINATION:  XR SINUSES MIN 3 VIEWS    CLINICAL HISTORY:  exclude sinusitis;  Cough, unspecified    TECHNIQUE:  AP, lateral, and Hdz views of the paranasal sinuses were performed    COMPARISON:  None.    FINDINGS:  Leftward nasal septal deviation.  Paranasal sinuses and mastoid air cells clear.  No acute osseous  abnormality.  Poor dentition noted.  Impression: No significant sinus disease.    Electronically signed by: Lance Frederick MD  Date:    04/18/2022  Time:    11:07      Office Spirometry Results:     No flowsheet data found.  Pulmonary Studies Review 4/18/2022   SpO2 99   Height 73   Weight 3389.79   BMI (Calculated) 28   Predicted Distance 344.12   Predicted Distance Meters (Calculated) 573.94         Assessment:       Hypertension  Blood pressure measurements reviewed, repeated and verified. Adverse effects of elevated blood pressure reviewed in detail. Importance of low sodium diet, medication compliance stressed. Patient advised and counseled concerning the adverse medical consequences of untreated hypertension.The patient's hypertension was monitored, evaluated, addressed and/or treated. Asked to follow up with PCP.   Isolated diastolic hypertension is a blood pressure <130/?80 mmHg      Seasonal allergic rhinitis due to other allergic trigger  -     fluticasone propionate (FLONASE) 50 mcg/actuation nasal spray; 2 sprays (100 mcg total) by Each Nostril route once daily.  Dispense: 16 g; Refill: 11  -     methylPREDNISolone (MEDROL DOSEPACK) 4 mg tablet; use as directed  Dispense: 1 each; Refill: 0  -     X-Ray Sinuses Min 3 Views; Future; Expected date: 04/18/2022  -     doxycycline (MONODOX) 100 MG capsule; Take 1 capsule (100 mg total) by mouth every 12 (twelve) hours.  Dispense: 20 capsule; Refill: 1    Asthma with COPD  -     albuterol (VENTOLIN HFA) 90 mcg/actuation inhaler; Inhale 2 puffs into the lungs every 4 (four) hours as needed for Shortness of Breath.  Dispense: 18 g; Refill: 11  -     X-Ray Chest PA And Lateral; Future; Expected date: 04/18/2022  -     Complete PFT with bronchodilator; Future; Expected date: 04/18/2022    ANDRZEJ on CPAP  -     CPAP/BIPAP SUPPLIES    Cough  -     promethazine-dextromethorphan (PROMETHAZINE-DM) 6.25-15 mg/5 mL Syrp; Take 5 mLs by mouth every 6 (six) hours as needed  (cough).  Dispense: 240 mL; Refill: 5  -     X-Ray Sinuses Min 3 Views; Future; Expected date: 04/18/2022    Mild persistent asthma with acute exacerbation  -     fluticasone-salmeterol diskus inhaler 250-50 mcg; Inhale 1 puff into the lungs 2 (two) times daily. Wash out mouth after use.  Dispense: 1 each; Refill: 11    Mild intermittent asthma without complication    Hypertension, unspecified type          Outpatient Encounter Medications as of 4/18/2022   Medication Sig Dispense Refill    amLODIPine (NORVASC) 10 MG tablet Take 10 mg by mouth once daily.      aspirin 81 mg Cap Take by mouth daily as needed.      atorvastatin (LIPITOR) 20 MG tablet Take 20 mg by mouth once daily.  3    clopidogrel (PLAVIX) 75 mg tablet Take 75 mg by mouth once daily.  1    digoxin (LANOXIN) 250 mcg tablet Take 0.25 mg by mouth once daily.      gabapentin (NEURONTIN) 300 MG capsule Take 300 mg by mouth 3 (three) times daily.       HYDROcodone-acetaminophen (NORCO)  mg per tablet Take 1 tablet by mouth 2 (two) times daily as needed.      inhalation device (VORTEX HOLDING CHAMBER) Use as directed for inhalation. 1 Device prn    metoprolol succinate (TOPROL-XL) 50 MG 24 hr tablet Take 50 mg by mouth 2 (two) times daily.      multivitamin with folic acid 400 mcg Tab Take 1 tablet by mouth once daily.      NIFEdipine (PROCARDIA-XL) 60 MG (OSM) 24 hr tablet Take 60 mg by mouth once daily.      pantoprazole (PROTONIX) 40 MG tablet Take 1 tablet (40 mg total) by mouth once daily. 90 tablet 1    ranolazine (RANEXA) 500 MG Tb12 Take 500 mg by mouth 2 (two) times daily.      sertraline (ZOLOFT) 50 MG tablet Take 50 mg by mouth daily as needed.      sildenafiL (VIAGRA) 100 MG tablet Take 100 mg by mouth daily as needed.      [DISCONTINUED] albuterol (VENTOLIN HFA) 90 mcg/actuation inhaler Inhale 2 puffs into the lungs every 4 (four) hours as needed for Shortness of Breath. 18 g 11    [DISCONTINUED] promethazine-codeine  6.25-10 mg/5 ml (PHENERGAN WITH CODEINE) 6.25-10 mg/5 mL syrup TAKE 1 teaspoonful (5ml) BY MOUTH NIGHTLY AS NEEDED FOR COUGH 240 mL 5    albuterol (VENTOLIN HFA) 90 mcg/actuation inhaler Inhale 2 puffs into the lungs every 4 (four) hours as needed for Shortness of Breath. 18 g 11    doxycycline (MONODOX) 100 MG capsule Take 1 capsule (100 mg total) by mouth every 12 (twelve) hours. 20 capsule 1    fluticasone propionate (FLONASE) 50 mcg/actuation nasal spray 2 sprays (100 mcg total) by Each Nostril route once daily. 16 g 11    fluticasone-salmeterol diskus inhaler 250-50 mcg Inhale 1 puff into the lungs 2 (two) times daily. Wash out mouth after use. 1 each 11    methylPREDNISolone (MEDROL DOSEPACK) 4 mg tablet use as directed 1 each 0    promethazine-dextromethorphan (PROMETHAZINE-DM) 6.25-15 mg/5 mL Syrp Take 5 mLs by mouth every 6 (six) hours as needed (cough). 240 mL 5    [DISCONTINUED] fluticasone-salmeterol diskus inhaler 250-50 mcg Inhale 1 puff into the lungs 2 (two) times daily. Wash out mouth after use. 1 each 11     No facility-administered encounter medications on file as of 2022.     Plan:       Requested Prescriptions     Signed Prescriptions Disp Refills    fluticasone propionate (FLONASE) 50 mcg/actuation nasal spray 16 g 11     Si sprays (100 mcg total) by Each Nostril route once daily.    albuterol (VENTOLIN HFA) 90 mcg/actuation inhaler 18 g 11     Sig: Inhale 2 puffs into the lungs every 4 (four) hours as needed for Shortness of Breath.    fluticasone-salmeterol diskus inhaler 250-50 mcg 1 each 11     Sig: Inhale 1 puff into the lungs 2 (two) times daily. Wash out mouth after use.    promethazine-dextromethorphan (PROMETHAZINE-DM) 6.25-15 mg/5 mL Syrp 240 mL 5     Sig: Take 5 mLs by mouth every 6 (six) hours as needed (cough).    methylPREDNISolone (MEDROL DOSEPACK) 4 mg tablet 1 each 0     Sig: use as directed    doxycycline (MONODOX) 100 MG capsule 20 capsule 1     Sig:  Take 1 capsule (100 mg total) by mouth every 12 (twelve) hours.     Problem List Items Addressed This Visit     Hypertension (Chronic)    Current Assessment & Plan     Blood pressure measurements reviewed, repeated and verified. Adverse effects of elevated blood pressure reviewed in detail. Importance of low sodium diet, medication compliance stressed. Patient advised and counseled concerning the adverse medical consequences of untreated hypertension.The patient's hypertension was monitored, evaluated, addressed and/or treated. Asked to follow up with PCP.   Isolated diastolic hypertension is a blood pressure <130/?80 mmHg             Asthma with COPD    Relevant Medications    albuterol (VENTOLIN HFA) 90 mcg/actuation inhaler    Other Relevant Orders    X-Ray Chest PA And Lateral    Complete PFT with bronchodilator    Mild intermittent asthma without complication    ANDRZEJ on CPAP    Overview     Compliant with PAP and benefits from use. Follow up annually in the sleep clinic.             Relevant Orders    CPAP/BIPAP SUPPLIES    Seasonal allergic rhinitis - Primary    Relevant Medications    fluticasone propionate (FLONASE) 50 mcg/actuation nasal spray    methylPREDNISolone (MEDROL DOSEPACK) 4 mg tablet    doxycycline (MONODOX) 100 MG capsule    Other Relevant Orders    X-Ray Sinuses Min 3 Views (Completed)      Other Visit Diagnoses     Cough        Relevant Medications    promethazine-dextromethorphan (PROMETHAZINE-DM) 6.25-15 mg/5 mL Syrp    Other Relevant Orders    X-Ray Sinuses Min 3 Views (Completed)    Mild persistent asthma with acute exacerbation        Relevant Medications    fluticasone-salmeterol diskus inhaler 250-50 mcg             Follow up in about 1 year (around 4/18/2023) for Review PFT and CXR - on return.    MEDICAL DECISION MAKING: Moderate to high complexity.  Overall, the multiple problems listed are of moderate to high severity that may impact quality of life and activities of daily living.  Side effects of medications, treatment plan as well as options and alternatives reviewed and discussed with patient. There was counseling of patient concerning these issues.    Total time spent in counseling and coordination of care - 40    minutes of total time spent on the encounter, which includes face to face time and non-face to face time preparing to see the patient (eg, review of tests), Obtaining and/or reviewing separately obtained history, Documenting clinical information in the electronic or other health record, Independently interpreting results (not separately reported) and communicating results to the patient/family/caregiver, or Care coordination (not separately reported).    Time was used in discussion of prognosis, risks, benefits of treatment, instructions and compliance with regimen . Discussion with other physicians and/or health care providers - home health or for use of durable medical equipment (oxygen, nebulizers, CPAP, BiPAP) occurred.

## 2022-04-28 ENCOUNTER — TELEPHONE (OUTPATIENT)
Dept: ADMINISTRATIVE | Facility: HOSPITAL | Age: 72
End: 2022-04-28
Payer: MEDICARE

## 2022-05-03 ENCOUNTER — TELEPHONE (OUTPATIENT)
Dept: ADMINISTRATIVE | Facility: HOSPITAL | Age: 72
End: 2022-05-03
Payer: MEDICARE

## 2022-05-11 ENCOUNTER — OFFICE VISIT (OUTPATIENT)
Dept: FAMILY MEDICINE | Facility: CLINIC | Age: 72
End: 2022-05-11
Payer: MEDICARE

## 2022-05-11 VITALS
WEIGHT: 208.56 LBS | HEART RATE: 64 BPM | RESPIRATION RATE: 20 BRPM | HEIGHT: 73 IN | SYSTOLIC BLOOD PRESSURE: 115 MMHG | TEMPERATURE: 98 F | BODY MASS INDEX: 27.64 KG/M2 | OXYGEN SATURATION: 98 % | DIASTOLIC BLOOD PRESSURE: 58 MMHG

## 2022-05-11 DIAGNOSIS — F41.9 ANXIETY AND DEPRESSION: ICD-10-CM

## 2022-05-11 DIAGNOSIS — K51.40 PSEUDOPOLYPOSIS OF COLON WITHOUT COMPLICATION, UNSPECIFIED PART OF COLON: ICD-10-CM

## 2022-05-11 DIAGNOSIS — G47.33 OSA ON CPAP: ICD-10-CM

## 2022-05-11 DIAGNOSIS — I10 HYPERTENSION, UNSPECIFIED TYPE: Chronic | ICD-10-CM

## 2022-05-11 DIAGNOSIS — Z00.00 ENCOUNTER FOR PREVENTIVE HEALTH EXAMINATION: Primary | ICD-10-CM

## 2022-05-11 DIAGNOSIS — J44.89 ASTHMA WITH COPD: ICD-10-CM

## 2022-05-11 DIAGNOSIS — N18.31 CHRONIC KIDNEY DISEASE, STAGE 3A: ICD-10-CM

## 2022-05-11 DIAGNOSIS — I25.2 HISTORY OF MI (MYOCARDIAL INFARCTION): ICD-10-CM

## 2022-05-11 DIAGNOSIS — I25.10 CORONARY ARTERY DISEASE INVOLVING NATIVE CORONARY ARTERY OF NATIVE HEART WITHOUT ANGINA PECTORIS: ICD-10-CM

## 2022-05-11 DIAGNOSIS — M15.9 GENERALIZED OA: ICD-10-CM

## 2022-05-11 DIAGNOSIS — F32.A ANXIETY AND DEPRESSION: ICD-10-CM

## 2022-05-11 DIAGNOSIS — I47.10 SUPRAVENTRICULAR TACHYCARDIA: ICD-10-CM

## 2022-05-11 DIAGNOSIS — D70.8 OTHER NEUTROPENIA: ICD-10-CM

## 2022-05-11 DIAGNOSIS — N40.0 ENLARGED PROSTATE: ICD-10-CM

## 2022-05-11 DIAGNOSIS — M54.16 LUMBAR RADICULOPATHY, CHRONIC: ICD-10-CM

## 2022-05-11 DIAGNOSIS — G25.81 RESTLESS LEG SYNDROME, CONTROLLED: Chronic | ICD-10-CM

## 2022-05-11 DIAGNOSIS — K21.9 GASTROESOPHAGEAL REFLUX DISEASE, UNSPECIFIED WHETHER ESOPHAGITIS PRESENT: Chronic | ICD-10-CM

## 2022-05-11 DIAGNOSIS — I73.9 PERIPHERAL VASCULAR DISEASE, UNSPECIFIED: ICD-10-CM

## 2022-05-11 DIAGNOSIS — R73.9 ELEVATED BLOOD SUGAR: ICD-10-CM

## 2022-05-11 DIAGNOSIS — M06.9 RHEUMATOID ARTHRITIS, INVOLVING UNSPECIFIED SITE, UNSPECIFIED WHETHER RHEUMATOID FACTOR PRESENT: ICD-10-CM

## 2022-05-11 PROCEDURE — 1159F PR MEDICATION LIST DOCUMENTED IN MEDICAL RECORD: ICD-10-PCS | Mod: CPTII,S$GLB,, | Performed by: NURSE PRACTITIONER

## 2022-05-11 PROCEDURE — 1160F PR REVIEW ALL MEDS BY PRESCRIBER/CLIN PHARMACIST DOCUMENTED: ICD-10-PCS | Mod: CPTII,S$GLB,, | Performed by: NURSE PRACTITIONER

## 2022-05-11 PROCEDURE — 3078F PR MOST RECENT DIASTOLIC BLOOD PRESSURE < 80 MM HG: ICD-10-PCS | Mod: CPTII,S$GLB,, | Performed by: NURSE PRACTITIONER

## 2022-05-11 PROCEDURE — 3074F SYST BP LT 130 MM HG: CPT | Mod: CPTII,S$GLB,, | Performed by: NURSE PRACTITIONER

## 2022-05-11 PROCEDURE — 3288F FALL RISK ASSESSMENT DOCD: CPT | Mod: CPTII,S$GLB,, | Performed by: NURSE PRACTITIONER

## 2022-05-11 PROCEDURE — 3008F PR BODY MASS INDEX (BMI) DOCUMENTED: ICD-10-PCS | Mod: CPTII,S$GLB,, | Performed by: NURSE PRACTITIONER

## 2022-05-11 PROCEDURE — 3288F PR FALLS RISK ASSESSMENT DOCUMENTED: ICD-10-PCS | Mod: CPTII,S$GLB,, | Performed by: NURSE PRACTITIONER

## 2022-05-11 PROCEDURE — 1160F RVW MEDS BY RX/DR IN RCRD: CPT | Mod: CPTII,S$GLB,, | Performed by: NURSE PRACTITIONER

## 2022-05-11 PROCEDURE — 3008F BODY MASS INDEX DOCD: CPT | Mod: CPTII,S$GLB,, | Performed by: NURSE PRACTITIONER

## 2022-05-11 PROCEDURE — 3074F PR MOST RECENT SYSTOLIC BLOOD PRESSURE < 130 MM HG: ICD-10-PCS | Mod: CPTII,S$GLB,, | Performed by: NURSE PRACTITIONER

## 2022-05-11 PROCEDURE — 99999 PR PBB SHADOW E&M-EST. PATIENT-LVL V: ICD-10-PCS | Mod: PBBFAC,,, | Performed by: NURSE PRACTITIONER

## 2022-05-11 PROCEDURE — 1101F PR PT FALLS ASSESS DOC 0-1 FALLS W/OUT INJ PAST YR: ICD-10-PCS | Mod: CPTII,S$GLB,, | Performed by: NURSE PRACTITIONER

## 2022-05-11 PROCEDURE — 99999 PR PBB SHADOW E&M-EST. PATIENT-LVL V: CPT | Mod: PBBFAC,,, | Performed by: NURSE PRACTITIONER

## 2022-05-11 PROCEDURE — 1101F PT FALLS ASSESS-DOCD LE1/YR: CPT | Mod: CPTII,S$GLB,, | Performed by: NURSE PRACTITIONER

## 2022-05-11 PROCEDURE — 1125F AMNT PAIN NOTED PAIN PRSNT: CPT | Mod: CPTII,S$GLB,, | Performed by: NURSE PRACTITIONER

## 2022-05-11 PROCEDURE — G0439 PR MEDICARE ANNUAL WELLNESS SUBSEQUENT VISIT: ICD-10-PCS | Mod: S$GLB,,, | Performed by: NURSE PRACTITIONER

## 2022-05-11 PROCEDURE — 1125F PR PAIN SEVERITY QUANTIFIED, PAIN PRESENT: ICD-10-PCS | Mod: CPTII,S$GLB,, | Performed by: NURSE PRACTITIONER

## 2022-05-11 PROCEDURE — 1159F MED LIST DOCD IN RCRD: CPT | Mod: CPTII,S$GLB,, | Performed by: NURSE PRACTITIONER

## 2022-05-11 PROCEDURE — 3078F DIAST BP <80 MM HG: CPT | Mod: CPTII,S$GLB,, | Performed by: NURSE PRACTITIONER

## 2022-05-11 PROCEDURE — G0439 PPPS, SUBSEQ VISIT: HCPCS | Mod: S$GLB,,, | Performed by: NURSE PRACTITIONER

## 2022-05-11 NOTE — PROGRESS NOTES
"  Speedy Valle presented for a  Medicare AWV and comprehensive Health Risk Assessment today. The following components were reviewed and updated:    · Medical history  · Family History  · Social history  · Allergies and Current Medications  · Health Risk Assessment  · Health Maintenance  · Care Team         ** See Completed Assessments for Annual Wellness Visit within the encounter summary.**         The following assessments were completed:  · Living Situation  · CAGE  · Depression Screening  · Timed Get Up and Go  · Whisper Test  · Cognitive Function Screening  · Nutrition Screening  · ADL Screening  · PAQ Screening        Vitals:    05/11/22 0810   BP: (!) 115/58   Pulse: 64   Resp: 20   Temp: 98 °F (36.7 °C)   SpO2: 98%   Weight: 94.6 kg (208 lb 8.9 oz)   Height: 6' 1" (1.854 m)     Body mass index is 27.52 kg/m².  Physical Exam  Vitals and nursing note reviewed.   Constitutional:       General: He is not in acute distress.     Appearance: He is well-developed.   HENT:      Head: Normocephalic and atraumatic.   Eyes:      Pupils: Pupils are equal, round, and reactive to light.   Neck:      Vascular: No carotid bruit.   Cardiovascular:      Rate and Rhythm: Normal rate and regular rhythm.      Pulses: Normal pulses.      Heart sounds: Normal heart sounds. No murmur heard.    No gallop.      Comments: Cardiac tracer  Pulmonary:      Effort: Pulmonary effort is normal.      Breath sounds: Normal breath sounds.   Abdominal:      General: Bowel sounds are normal. There is no distension.      Palpations: Abdomen is soft.      Tenderness: There is no abdominal tenderness.   Musculoskeletal:         General: Normal range of motion.   Skin:     General: Skin is warm and dry.   Neurological:      Motor: No abnormal muscle tone.      Gait: Gait normal.   Psychiatric:         Speech: Speech normal.         Behavior: Behavior normal.         Thought Content: Thought content normal.         Judgment: Judgment normal. "         Current Outpatient Medications:     albuterol (VENTOLIN HFA) 90 mcg/actuation inhaler, Inhale 2 puffs into the lungs every 4 (four) hours as needed for Shortness of Breath., Disp: 18 g, Rfl: 11    amLODIPine (NORVASC) 10 MG tablet, Take 10 mg by mouth once daily., Disp: , Rfl:     aspirin 81 mg Cap, Take by mouth daily as needed., Disp: , Rfl:     clopidogrel (PLAVIX) 75 mg tablet, Take 75 mg by mouth once daily., Disp: , Rfl: 1    digoxin (LANOXIN) 250 mcg tablet, Take 0.25 mg by mouth once daily., Disp: , Rfl:     fluticasone propionate (FLONASE) 50 mcg/actuation nasal spray, 2 sprays (100 mcg total) by Each Nostril route once daily., Disp: 16 g, Rfl: 11    fluticasone-salmeterol diskus inhaler 250-50 mcg, Inhale 1 puff into the lungs 2 (two) times daily. Wash out mouth after use., Disp: 1 each, Rfl: 11    metoprolol succinate (TOPROL-XL) 50 MG 24 hr tablet, Take 50 mg by mouth 2 (two) times daily., Disp: , Rfl:     NIFEdipine (PROCARDIA-XL) 60 MG (OSM) 24 hr tablet, Take 60 mg by mouth once daily., Disp: , Rfl:     pantoprazole (PROTONIX) 40 MG tablet, Take 1 tablet (40 mg total) by mouth once daily., Disp: 90 tablet, Rfl: 1    promethazine-dextromethorphan (PROMETHAZINE-DM) 6.25-15 mg/5 mL Syrp, Take 5 mLs by mouth every 6 (six) hours as needed (cough)., Disp: 240 mL, Rfl: 5    ranolazine (RANEXA) 500 MG Tb12, Take 500 mg by mouth 2 (two) times daily., Disp: , Rfl:     sildenafiL (VIAGRA) 100 MG tablet, Take 100 mg by mouth daily as needed., Disp: , Rfl:     atorvastatin (LIPITOR) 20 MG tablet, Take 20 mg by mouth once daily., Disp: , Rfl: 3    doxycycline (MONODOX) 100 MG capsule, Take 1 capsule (100 mg total) by mouth every 12 (twelve) hours., Disp: 20 capsule, Rfl: 1    gabapentin (NEURONTIN) 300 MG capsule, Take 300 mg by mouth 3 (three) times daily. , Disp: , Rfl:     HYDROcodone-acetaminophen (NORCO)  mg per tablet, Take 1 tablet by mouth 2 (two) times daily as needed., Disp:  , Rfl:     inhalation device (V[employer] HOLDING CHAMBER), Use as directed for inhalation., Disp: 1 Device, Rfl: prn    methylPREDNISolone (MEDROL DOSEPACK) 4 mg tablet, use as directed, Disp: 1 each, Rfl: 0    multivitamin with folic acid 400 mcg Tab, Take 1 tablet by mouth once daily., Disp: , Rfl:     sertraline (ZOLOFT) 50 MG tablet, Take 50 mg by mouth daily as needed., Disp: , Rfl:           Diagnoses and health risks identified today and associated recommendations/orders:    1. Encounter for preventive health examination    2. Hypertension, unspecified type  Chronic and Stable on Metoprolol, Norvasc, Procardia . Continue current treatment plan as previously prescribed with your cardiologist- Dr. Monique    3. Peripheral vascular disease, unspecified  Chronic and ongoing  Ranexa, ASA and Lipitor. Continue current treatment plan as previously prescribed with your  Dr. Monique     4. Supraventricular tachycardia  Chronic and Stable on Metoprolol and Lanoxin  - pt is on cardiac tracer/mointer -states his Metoprolol dose was increased  2 months ago . Continue current treatment plan as previously prescribed with your Dr. Monique     5. History of MI (myocardial infarction)  Stable - pt states he has mx MI- S/P Cardiac stents x 2- see med list. Followed by PCP    6. Anxiety and depression  .This problem is currently not controlled. States she cant take the zoloft due to s./e  Please follow up with your PCP as planned to discuss adjustments to your treatment plan.    7. Asthma with COPD  Chronic and Stable on Advair daily and Proventil as needed  Continue current treatment plan as previously prescribed with your Pulmnologist    8. Gastroesophageal reflux disease, unspecified whether esophagitis present  Chronic and Stable on Protonix . Continue current treatment plan as previously prescribed with your PCP    9. Pseudopolyposis of colon without complication, unspecified part of colon  Chronic and Stable. Continue current  treatment plan as previously prescribed with your PCP    10. ANDRZEJ on CPAP  Chronic and Stable on CPAP. Continue current treatment plan as previously prescribed with your Pulmnologist    11. Generalized OA  Chronic and Ongoing on Neurontin/ Norco. Continue current treatment plan as previously prescribed with your outside pain managemetn     12. Enlarged prostate  Stable- states frequent urination- seen  Urologist last yr- PSA wnl - request to wait for repeat  Lab with pcp     13. Restless leg syndrome, controlled  This problem is currently not controlled. States the previus med tx didn't work- follow up with PCP    14.Coronary artery disease involving native coronary artery of native heart without angina pectoris    Stable - pt states he has mx MI- S/P Cardiac stents x 2- see med list. Followed by PCP    15. Lumbar radiculopathy, chronic  Chronic and Ongoing on Neurontin/ Norco. Continue current treatment plan as previously prescribed with your outside pain managemetn     16 Chronic kidney disease, stage 3a  Chronic and Stable. Continue current treatment plan as previously prescribed with your PCP    Provided Speedy with a 5-10 year written screening schedule and personal prevention plan. Recommendations were developed using the USPSTF age appropriate recommendations. Education, counseling, and referrals were provided as needed. After Visit Summary printed and given to patient which includes a list of additional screenings\tests needed.     I offered to discuss advanced care planning, including how to pick a person who would make decisions for you if you were unable to make them for yourself, called a health care power of , and what kind of decisions you might make such as use of life sustaining treatments such as ventilators and tube feeding when faced with a life limiting illness recorded on a living will that they will need to know. (How you want to be cared for as you near the end of your natural life)     X  Patient is interested in learning more about how to make advanced directives.  I provided them paperwork and offered to discuss this with them.    Jessy Valle NP

## 2022-05-11 NOTE — PATIENT INSTRUCTIONS
Counseling and Referral of Other Preventative  (Italic type indicates deductible and co-insurance are waived)    Patient Name: Speedy Valle  Today's Date: 5/11/2022    Health Maintenance       Date Due Completion Date    Hepatitis C Screening Never done ---    Abdominal Aortic Aneurysm Screening Never done ---    Pneumococcal Vaccines (Age 65+) (2 - PCV) 11/14/2018 11/14/2017    Shingles Vaccine (3 of 3) 08/19/2019 6/24/2019    COVID-19 Vaccine (4 - Booster for Pfizer series) 01/28/2022 9/28/2021    High Dose Statin 04/18/2023 4/18/2022    Lipid Panel 11/08/2026 11/8/2021    TETANUS VACCINE 06/24/2029 6/24/2019    Colorectal Cancer Screening 07/21/2031 7/21/2021        No orders of the defined types were placed in this encounter.    The following information is provided to all patients.  This information is to help you find resources for any of the problems found today that may be affecting your health:                Living healthy guide: www.[facility].[place].[facility]      Understanding Diabetes: www.diabetes.org      Eating healthy: www.cdc.gov/healthyweight      [facility] home safety checklist: www.cdc.gov/steadi/patient.html      Agency on Aging: www.goea.[place].gov      Alcoholics anonymous (AA): www.aa.org      Physical Activity: www.cristobal.nih.gov/nl3viuu      Tobacco use: www.quitwithusla.org     Counseling and Referral of Other Preventative  (Italic type indicates deductible and co-insurance are waived)    Patient Name: Speedy Valle  Today's Date: 5/12/2022    Health Maintenance       Date Due Completion Date    Hepatitis C Screening Never done ---    Abdominal Aortic Aneurysm Screening Never done ---    Pneumococcal Vaccines (Age 65+) (2 - PCV) 11/14/2018 11/14/2017    Shingles Vaccine (3 of 3) 08/19/2019 6/24/2019    COVID-19 Vaccine (4 - Booster for Pfizer series) 01/28/2022 9/28/2021    High Dose Statin 04/18/2023 4/18/2022    Lipid Panel 11/08/2026 11/8/2021    TETANUS VACCINE 06/24/2029 6/24/2019    Colorectal  Cancer Screening 07/21/2031 7/21/2021        No orders of the defined types were placed in this encounter.    The following information is provided to all patients.  This information is to help you find resources for any of the problems found today that may be affecting your health:                Living healthy guide: www.[facility].[place].[facility]      Understanding Diabetes: www.diabetes.org      Eating healthy: www.cdc.gov/healthyweight      CDC home safety checklist: www.cdc.gov/steadi/patient.html      Agency on Aging: www.goea.[place]      Alcoholics anonymous (AA): www.aa.org      Physical Activity: www.cristobal.nih.gov/gg8lgsy      Tobacco use: www.quitwithusla.org

## 2022-05-24 ENCOUNTER — PATIENT MESSAGE (OUTPATIENT)
Dept: FAMILY MEDICINE | Facility: CLINIC | Age: 72
End: 2022-05-24
Payer: MEDICARE

## 2022-05-25 ENCOUNTER — PATIENT MESSAGE (OUTPATIENT)
Dept: FAMILY MEDICINE | Facility: CLINIC | Age: 72
End: 2022-05-25
Payer: MEDICARE

## 2023-01-10 DIAGNOSIS — R05.9 COUGH: ICD-10-CM

## 2023-01-10 RX ORDER — PROMETHAZINE HYDROCHLORIDE AND DEXTROMETHORPHAN HYDROBROMIDE 6.25; 15 MG/5ML; MG/5ML
5 SYRUP ORAL EVERY 6 HOURS PRN
Qty: 240 ML | Refills: 5 | Status: SHIPPED | OUTPATIENT
Start: 2023-01-10 | End: 2024-01-18 | Stop reason: SDUPTHER

## 2023-02-01 DIAGNOSIS — N18.31 CHRONIC KIDNEY DISEASE, STAGE 3A: ICD-10-CM

## 2023-06-14 ENCOUNTER — HOSPITAL ENCOUNTER (OUTPATIENT)
Dept: RADIOLOGY | Facility: HOSPITAL | Age: 73
Discharge: HOME | End: 2023-06-14
Attending: INTERNAL MEDICINE
Payer: MEDICARE

## 2023-06-14 DIAGNOSIS — J44.89 ASTHMA WITH COPD: ICD-10-CM

## 2023-06-14 PROCEDURE — 71046 X-RAY EXAM CHEST 2 VIEWS: CPT | Mod: TC

## 2023-06-14 PROCEDURE — 71046 X-RAY EXAM CHEST 2 VIEWS: CPT | Mod: 26,,, | Performed by: STUDENT IN AN ORGANIZED HEALTH CARE EDUCATION/TRAINING PROGRAM

## 2023-06-14 PROCEDURE — 71046 XR CHEST PA AND LATERAL: ICD-10-PCS | Mod: 26,,, | Performed by: STUDENT IN AN ORGANIZED HEALTH CARE EDUCATION/TRAINING PROGRAM

## 2023-06-15 ENCOUNTER — OFFICE VISIT (OUTPATIENT)
Dept: PULMONOLOGY | Facility: CLINIC | Age: 73
End: 2023-06-15
Payer: MEDICARE

## 2023-06-15 VITALS
OXYGEN SATURATION: 99 % | HEIGHT: 73 IN | WEIGHT: 211 LBS | BODY MASS INDEX: 27.96 KG/M2 | SYSTOLIC BLOOD PRESSURE: 110 MMHG | HEART RATE: 78 BPM | DIASTOLIC BLOOD PRESSURE: 60 MMHG | RESPIRATION RATE: 18 BRPM

## 2023-06-15 DIAGNOSIS — E66.01 MORBID OBESITY: Primary | ICD-10-CM

## 2023-06-15 DIAGNOSIS — J30.0 CHRONIC VASOMOTOR RHINITIS: ICD-10-CM

## 2023-06-15 DIAGNOSIS — J44.89 ASTHMA WITH COPD: ICD-10-CM

## 2023-06-15 DIAGNOSIS — M06.9 RHEUMATOID ARTHRITIS, INVOLVING UNSPECIFIED SITE, UNSPECIFIED WHETHER RHEUMATOID FACTOR PRESENT: ICD-10-CM

## 2023-06-15 PROCEDURE — 99214 OFFICE O/P EST MOD 30 MIN: CPT | Mod: S$GLB,,, | Performed by: INTERNAL MEDICINE

## 2023-06-15 PROCEDURE — 3288F PR FALLS RISK ASSESSMENT DOCUMENTED: ICD-10-PCS | Mod: CPTII,S$GLB,, | Performed by: INTERNAL MEDICINE

## 2023-06-15 PROCEDURE — 1126F AMNT PAIN NOTED NONE PRSNT: CPT | Mod: CPTII,S$GLB,, | Performed by: INTERNAL MEDICINE

## 2023-06-15 PROCEDURE — 3078F DIAST BP <80 MM HG: CPT | Mod: CPTII,S$GLB,, | Performed by: INTERNAL MEDICINE

## 2023-06-15 PROCEDURE — 3008F BODY MASS INDEX DOCD: CPT | Mod: CPTII,S$GLB,, | Performed by: INTERNAL MEDICINE

## 2023-06-15 PROCEDURE — 3288F FALL RISK ASSESSMENT DOCD: CPT | Mod: CPTII,S$GLB,, | Performed by: INTERNAL MEDICINE

## 2023-06-15 PROCEDURE — 1159F PR MEDICATION LIST DOCUMENTED IN MEDICAL RECORD: ICD-10-PCS | Mod: CPTII,S$GLB,, | Performed by: INTERNAL MEDICINE

## 2023-06-15 PROCEDURE — 99214 PR OFFICE/OUTPT VISIT, EST, LEVL IV, 30-39 MIN: ICD-10-PCS | Mod: S$GLB,,, | Performed by: INTERNAL MEDICINE

## 2023-06-15 PROCEDURE — 1100F PR PT FALLS ASSESS DOC 2+ FALLS/FALL W/INJURY/YR: ICD-10-PCS | Mod: CPTII,S$GLB,, | Performed by: INTERNAL MEDICINE

## 2023-06-15 PROCEDURE — 99999 PR PBB SHADOW E&M-EST. PATIENT-LVL V: CPT | Mod: PBBFAC,,, | Performed by: INTERNAL MEDICINE

## 2023-06-15 PROCEDURE — 3074F PR MOST RECENT SYSTOLIC BLOOD PRESSURE < 130 MM HG: ICD-10-PCS | Mod: CPTII,S$GLB,, | Performed by: INTERNAL MEDICINE

## 2023-06-15 PROCEDURE — 3074F SYST BP LT 130 MM HG: CPT | Mod: CPTII,S$GLB,, | Performed by: INTERNAL MEDICINE

## 2023-06-15 PROCEDURE — 3008F PR BODY MASS INDEX (BMI) DOCUMENTED: ICD-10-PCS | Mod: CPTII,S$GLB,, | Performed by: INTERNAL MEDICINE

## 2023-06-15 PROCEDURE — 3078F PR MOST RECENT DIASTOLIC BLOOD PRESSURE < 80 MM HG: ICD-10-PCS | Mod: CPTII,S$GLB,, | Performed by: INTERNAL MEDICINE

## 2023-06-15 PROCEDURE — 1100F PTFALLS ASSESS-DOCD GE2>/YR: CPT | Mod: CPTII,S$GLB,, | Performed by: INTERNAL MEDICINE

## 2023-06-15 PROCEDURE — 1159F MED LIST DOCD IN RCRD: CPT | Mod: CPTII,S$GLB,, | Performed by: INTERNAL MEDICINE

## 2023-06-15 PROCEDURE — 99999 PR PBB SHADOW E&M-EST. PATIENT-LVL V: ICD-10-PCS | Mod: PBBFAC,,, | Performed by: INTERNAL MEDICINE

## 2023-06-15 PROCEDURE — 1126F PR PAIN SEVERITY QUANTIFIED, NO PAIN PRESENT: ICD-10-PCS | Mod: CPTII,S$GLB,, | Performed by: INTERNAL MEDICINE

## 2023-06-15 RX ORDER — PREDNISONE 20 MG/1
TABLET ORAL
Qty: 20 TABLET | Refills: 0 | Status: SHIPPED | OUTPATIENT
Start: 2023-06-15 | End: 2024-01-18

## 2023-06-15 RX ORDER — BUDESONIDE, GLYCOPYRROLATE, AND FORMOTEROL FUMARATE 160; 9; 4.8 UG/1; UG/1; UG/1
AEROSOL, METERED RESPIRATORY (INHALATION)
COMMUNITY
End: 2023-06-15 | Stop reason: SDUPTHER

## 2023-06-15 RX ORDER — BUDESONIDE, GLYCOPYRROLATE, AND FORMOTEROL FUMARATE 160; 9; 4.8 UG/1; UG/1; UG/1
2 AEROSOL, METERED RESPIRATORY (INHALATION) 2 TIMES DAILY
Qty: 10.7 G | Refills: 11 | Status: SHIPPED | OUTPATIENT
Start: 2023-06-15 | End: 2024-01-18 | Stop reason: ALTCHOICE

## 2023-06-15 RX ORDER — DOXYCYCLINE HYCLATE 100 MG
100 TABLET ORAL 2 TIMES DAILY
Qty: 20 TABLET | Refills: 0 | Status: SHIPPED | OUTPATIENT
Start: 2023-06-15

## 2023-06-15 RX ORDER — IPRATROPIUM BROMIDE 21 UG/1
2 SPRAY, METERED NASAL 3 TIMES DAILY PRN
Qty: 30 ML | Refills: 11 | Status: SHIPPED | OUTPATIENT
Start: 2023-06-15

## 2023-06-15 NOTE — PROGRESS NOTES
Subjective:     Patient ID: Speedy Valle is a 73 y.o. male.    Chief Complaint:  Improved     HPI   Sinus Pain  Patient complains of clear rhinorrhea, congestion, headaches, nasal congestion, post nasal drip and sinus pressure. Onset of symptoms was 1 - 2  year  ago. Symptoms have worsened since that time. He is drinking plenty of fluids.  Past history is significant for asthma. Patient is former smoker, quit many years ago.    He has a  history of asthma , has not been taking any controller medications regularly, not taking nebulizer solution occasionally takes albuterol c/o cough every night and requests cough medication. Cough largely related to postnasal drip  Additionally has a history of Obstructive Sleep Apnea   Constant cough at night  - keps him up at night  Noncompliant with jet nebs  1 year follow up on Asthma with review of chest xray, no recent spirometry (COVID 19)- prior studies were normal . Chest xray is clear.      Asthma Follow-up  The patient has previously been evaluated here for asthma and presents for an asthma follow-up. The patient is currently having symptoms / an exacerbation. Current symptoms include dyspnea, non-productive cough and wheezing. Symptoms have been present since several months ago and have been unchanged. He denies dyspnea, non-productive cough and wheezing. Associated symptoms include shortness of breath.  This episode appears to have been triggered by no identifiable factor. Treatments tried for the current exacerbation include short-acting inhaled beta-adrenergic agonists, which have provided some relief of symptoms. The patient has been having similar episodes for approximately 10 years.     Current Disease Severity  The patient is having daytime symptoms daily. The patient is having daytime symptoms often 7 times per week. The patient is using short-acting beta agonists for symptom control daily. He has exacerbations requiring oral systemic corticosteroids 1 times per  year. Current limitations in activity from asthma: none. Number of days of school or work missed in the last month: not applicable. Number of urgent/emergent visit in last year: 0.  The patient is not using a spacer with MDIs. His best peak flow rate is na. He is not monitoring peak flow rates at home.     Sleep Apnea  He presents for a sleep evaluation. He complains of snorting, decreased concentration, excessive daytime sleepiness, falling asleep while reading, watching television, sinus problems, congested nose, difficulty falling asleep once awakened, feels sleepy during the day, take naps during the day.  Symptoms began 10 years ago, gradually worsening since that time.  He goes to sleep at 12 weekdays and  weekends. He awakens 5 weekdays and  weekends. He falls asleep in 10 minutes.  Collar size na. He denies knees buckling with laughing, completely or partially paralyzed while falling asleep or waking up. Previous evaluation and treatment has included PSG and PSG with C PAP titration.    Obstructive Sleep Apnea on Continuous Positive Airway Pressure:  Patient is using CPAP as prescribed and benefiting from therapy. Patient has complaints of none      Past Medical History:   Diagnosis Date    Allergic rhinitis     Anxiety with depression     Arthritis     Asthma with COPD     Benign colon polyp     Coronary artery disease     ED (erectile dysfunction)     GERD (gastroesophageal reflux disease)     Hyperlipidemia     Hypertension     MI (myocardial infarction) 2012    ANDRZEJ on CPAP     PVD (peripheral vascular disease)     RLS (restless legs syndrome)      Past Surgical History:   Procedure Laterality Date    CARDIAC CATHETERIZATION  2010    stents x 2    cardiac tracer      right hip replacement       Review of patient's allergies indicates:  No Known Allergies  Current Outpatient Medications on File Prior to Visit   Medication Sig Dispense Refill    albuterol (VENTOLIN HFA) 90 mcg/actuation inhaler Inhale 2  puffs into the lungs every 4 (four) hours as needed for Shortness of Breath. 18 g 11    amLODIPine (NORVASC) 10 MG tablet Take 10 mg by mouth once daily.      aspirin 81 mg Cap Take by mouth daily as needed.      atorvastatin (LIPITOR) 20 MG tablet Take 20 mg by mouth once daily.  3    clopidogrel (PLAVIX) 75 mg tablet Take 75 mg by mouth once daily.  1    digoxin (LANOXIN) 250 mcg tablet Take 0.25 mg by mouth once daily.      doxycycline (MONODOX) 100 MG capsule Take 1 capsule (100 mg total) by mouth every 12 (twelve) hours. 20 capsule 1    fluticasone propionate (FLONASE) 50 mcg/actuation nasal spray 2 sprays (100 mcg total) by Each Nostril route once daily. 16 g 11    gabapentin (NEURONTIN) 300 MG capsule Take 300 mg by mouth 3 (three) times daily.       HYDROcodone-acetaminophen (NORCO)  mg per tablet Take 1 tablet by mouth 2 (two) times daily as needed.      inhalation device (VORTEX HOLDING CHAMBER) Use as directed for inhalation. 1 Device prn    metoprolol succinate (TOPROL-XL) 50 MG 24 hr tablet Take 50 mg by mouth 2 (two) times daily.      multivitamin with folic acid 400 mcg Tab Take 1 tablet by mouth once daily.      NIFEdipine (PROCARDIA-XL) 60 MG (OSM) 24 hr tablet Take 60 mg by mouth once daily.      pantoprazole (PROTONIX) 40 MG tablet Take 1 tablet (40 mg total) by mouth once daily. 90 tablet 1    promethazine-dextromethorphan (PROMETHAZINE-DM) 6.25-15 mg/5 mL Syrp Take 5 mLs by mouth every 6 (six) hours as needed (cough). 240 mL 5    ranolazine (RANEXA) 500 MG Tb12 Take 500 mg by mouth 2 (two) times daily.      sertraline (ZOLOFT) 50 MG tablet Take 50 mg by mouth daily as needed.      sildenafiL (VIAGRA) 100 MG tablet Take 100 mg by mouth daily as needed.      [DISCONTINUED] budesonide-glycopyr-formoterol (BREZTRI AEROSPHERE) 160-9-4.8 mcg/actuation HFAA Inhale into the lungs.      methylPREDNISolone (MEDROL DOSEPACK) 4 mg tablet use as directed 1 each 0    [DISCONTINUED]  fluticasone-salmeterol diskus inhaler 250-50 mcg Inhale 1 puff into the lungs 2 (two) times daily. Wash out mouth after use. 1 each 11     No current facility-administered medications on file prior to visit.     Social History     Socioeconomic History    Marital status:    Occupational History    Occupation: Retired   Tobacco Use    Smoking status: Former     Packs/day: 1.50     Years: 14.00     Pack years: 21.00     Types: Cigarettes     Quit date: 1982     Years since quittin.7    Smokeless tobacco: Former     Types: Snuff     Quit date: 1996   Substance and Sexual Activity    Alcohol use: No     Alcohol/week: 0.0 standard drinks    Drug use: Yes     Types: Marijuana     Comment: every now and then    Sexual activity: Yes     Partners: Female     Family History   Problem Relation Age of Onset    Hypertension Mother     Stroke Mother     No Known Problems Daughter     No Known Problems Daughter     No Known Problems Daughter     Bipolar disorder Son        Review of Systems   Constitutional:  Positive for fatigue. Negative for fever.   HENT:  Positive for postnasal drip, rhinorrhea and congestion.    Eyes:  Negative for redness and itching.   Respiratory:  Positive for apnea, cough, sputum production, shortness of breath, dyspnea on extertion, use of rescue inhaler and Paroxysmal Nocturnal Dyspnea.    Cardiovascular:  Negative for chest pain, palpitations and leg swelling.   Genitourinary:  Negative for difficulty urinating and hematuria.   Endocrine:  Negative for cold intolerance and heat intolerance.    Musculoskeletal:  Positive for back pain.   Skin:  Negative for rash.   Gastrointestinal:  Negative for nausea and abdominal pain.   Neurological:  Negative for dizziness, syncope, weakness and light-headedness.   Hematological:  Negative for adenopathy. Does not bruise/bleed easily.   Psychiatric/Behavioral:  Negative for sleep disturbance. The patient is not nervous/anxious.     "  Objective:      /60   Pulse 78   Resp 18   Ht 6' 1" (1.854 m)   Wt 95.7 kg (210 lb 15.7 oz)   SpO2 99%   BMI 27.84 kg/m²   Physical Exam  Vitals and nursing note reviewed.   Constitutional:       Appearance: He is well-developed.   HENT:      Head: Normocephalic and atraumatic.   Eyes:      Conjunctiva/sclera: Conjunctivae normal.      Pupils: Pupils are equal, round, and reactive to light.   Neck:      Thyroid: No thyromegaly.      Vascular: No JVD.      Trachea: No tracheal deviation.   Cardiovascular:      Rate and Rhythm: Normal rate and regular rhythm.      Heart sounds: No murmur heard.  Pulmonary:      Breath sounds: Examination of the right-lower field reveals wheezing. Examination of the left-lower field reveals wheezing. Decreased breath sounds and wheezing present. No rhonchi or rales.   Abdominal:      General: Bowel sounds are normal.      Palpations: Abdomen is soft.   Musculoskeletal:         General: No tenderness. Normal range of motion.      Cervical back: Neck supple.   Lymphadenopathy:      Cervical: No cervical adenopathy.   Skin:     General: Skin is warm and dry.   Neurological:      Mental Status: He is alert and oriented to person, place, and time.     Personal Diagnostic Review  Chest x-ray: Stabel  X-Ray Chest PA And Lateral  Narrative: EXAM:  XR CHEST PA AND LATERAL    CLINICAL HISTORY:  COPD.    COMPARISON: 01/19/2021 and 06/01/2022 chest radiograph.    TECHNIQUE: PA and lateral views of the chest.    FINDINGS:    Medical support devices: Stable positioning and appearance.  Cardiomediastinal structures: Unchanged.  Lungs/pleura: No acute airspace or interstitial disease.  No pleural effusion. No pneumothorax.  Miscellaneous: No free air under the diaphragm. No acute osseous abnormality. Mild thoracic spondylosis.  Impression: 1. Clear chest without acute cardiopulmonary process.    Finalized on: 6/14/2023 9:11 AM By:  Buck Montalvo III, MD  [facility]# [id number]      2023-06-14 " 09:14:01.307    [facility]          Procedures  Eris Arboleda MD (Physician)   Pulmonary Disease  Procedure Orders   1. Home Sleep Studies [[id number]] ordered by Alfred Bernardo MD   Pre-procedure Diagnoses   1. ANDRZEJ on CPAP [G47.33, Z99.89]   Post-procedure Diagnoses   1. ANDRZEJ on CPAP [G47.33, Z99.89]      Home Sleep Studies     Date/Time: 2/3/2021 8:00 AM  Performed by: Eris Arboleda MD  Authorized by: Alfred Bernardo MD        2 night study  SEVERE OBSTRUCTIVE SLEEP APNEA with overall AHI 35.9/hr ( 219 events): night #2  Oxygen desaturation: 87 %. SpO2 between 90% to 94% for 37 min.  Patient snored 72% time above 50 .  Heart rate range: 55 bpm - 92 bpm  REC's:  CPAP titration  Therapy with APAP at 6-20 cm WP using mask of choice with heated humidification is an option            Pulmonary Studies Review 6/15/2023   SpO2 99   Height 73   Weight 3375.68   BMI (Calculated) 27.8   Predicted Distance 338.3   Predicted Distance Meters (Calculated) 569.63       X-Ray Chest PA And Lateral  Narrative: EXAM:  XR CHEST PA AND LATERAL    CLINICAL HISTORY:  COPD.    COMPARISON: 01/19/2021 and 06/01/2022 chest radiograph.    TECHNIQUE: PA and lateral views of the chest.    FINDINGS:    Medical support devices: Stable positioning and appearance.  Cardiomediastinal structures: Unchanged.  Lungs/pleura: No acute airspace or interstitial disease.  No pleural effusion. No pneumothorax.  Miscellaneous: No free air under the diaphragm. No acute osseous abnormality. Mild thoracic spondylosis.  Impression: 1. Clear chest without acute cardiopulmonary process.    Finalized on: 6/14/2023 9:11 AM By:  Buck Montalvo III, MD  [facility]# [id number]      2023-06-14 09:14:01.307    [facility]      Office Spirometry Results:     No flowsheet data found.  Pulmonary Studies Review 6/15/2023   SpO2 99   Height 73   Weight 3375.68   BMI (Calculated) 27.8   Predicted Distance 338.3   Predicted Distance Meters (Calculated) 569.63         Assessment:             Morbid obesity    Rheumatoid arthritis, involving unspecified site, unspecified whether rheumatoid factor present    Asthma with COPD  -     budesonide-glycopyr-formoterol (BREZTRI AEROSPHERE) 160-9-4.8 mcg/actuation HFAA; Inhale 2 puffs into the lungs 2 (two) times a day.  Dispense: 10.7 g; Refill: 11  -     predniSONE (DELTASONE) 20 MG tablet; Prednisone 60 mg/ day for 3 days, 40 mg/day for 3 days,20 mg/ day for 3 days, (1/2 tablet )10 mg a day for 3 days.  Dispense: 20 tablet; Refill: 0  -     doxycycline (VIBRA-TABS) 100 MG tablet; Take 1 tablet (100 mg total) by mouth 2 (two) times daily.  Dispense: 20 tablet; Refill: 0  -     Spirometry with/without bronchodilator; Future; Expected date: 12/16/2023    Chronic vasomotor rhinitis  -     ipratropium (ATROVENT) 21 mcg (0.03 %) nasal spray; 2 sprays by Each Nostril route 3 (three) times daily as needed for Rhinitis.  Dispense: 30 mL; Refill: 11          Outpatient Encounter Medications as of 6/15/2023   Medication Sig Dispense Refill    albuterol (VENTOLIN HFA) 90 mcg/actuation inhaler Inhale 2 puffs into the lungs every 4 (four) hours as needed for Shortness of Breath. 18 g 11    amLODIPine (NORVASC) 10 MG tablet Take 10 mg by mouth once daily.      aspirin 81 mg Cap Take by mouth daily as needed.      atorvastatin (LIPITOR) 20 MG tablet Take 20 mg by mouth once daily.  3    clopidogrel (PLAVIX) 75 mg tablet Take 75 mg by mouth once daily.  1    digoxin (LANOXIN) 250 mcg tablet Take 0.25 mg by mouth once daily.      doxycycline (MONODOX) 100 MG capsule Take 1 capsule (100 mg total) by mouth every 12 (twelve) hours. 20 capsule 1    fluticasone propionate (FLONASE) 50 mcg/actuation nasal spray 2 sprays (100 mcg total) by Each Nostril route once daily. 16 g 11    gabapentin (NEURONTIN) 300 MG capsule Take 300 mg by mouth 3 (three) times daily.       HYDROcodone-acetaminophen (NORCO)  mg per tablet Take 1 tablet by mouth 2 (two) times daily as needed.       inhalation device (VORTEX HOLDING CHAMBER) Use as directed for inhalation. 1 Device prn    metoprolol succinate (TOPROL-XL) 50 MG 24 hr tablet Take 50 mg by mouth 2 (two) times daily.      multivitamin with folic acid 400 mcg Tab Take 1 tablet by mouth once daily.      NIFEdipine (PROCARDIA-XL) 60 MG (OSM) 24 hr tablet Take 60 mg by mouth once daily.      pantoprazole (PROTONIX) 40 MG tablet Take 1 tablet (40 mg total) by mouth once daily. 90 tablet 1    promethazine-dextromethorphan (PROMETHAZINE-DM) 6.25-15 mg/5 mL Syrp Take 5 mLs by mouth every 6 (six) hours as needed (cough). 240 mL 5    ranolazine (RANEXA) 500 MG Tb12 Take 500 mg by mouth 2 (two) times daily.      sertraline (ZOLOFT) 50 MG tablet Take 50 mg by mouth daily as needed.      sildenafiL (VIAGRA) 100 MG tablet Take 100 mg by mouth daily as needed.      [DISCONTINUED] budesonide-glycopyr-formoterol (BREZTRI AEROSPHERE) 160-9-4.8 mcg/actuation HFAA Inhale into the lungs.      budesonide-glycopyr-formoterol (BREZTRI AEROSPHERE) 160-9-4.8 mcg/actuation HFAA Inhale 2 puffs into the lungs 2 (two) times a day. 10.7 g 11    doxycycline (VIBRA-TABS) 100 MG tablet Take 1 tablet (100 mg total) by mouth 2 (two) times daily. 20 tablet 0    ipratropium (ATROVENT) 21 mcg (0.03 %) nasal spray 2 sprays by Each Nostril route 3 (three) times daily as needed for Rhinitis. 30 mL 11    methylPREDNISolone (MEDROL DOSEPACK) 4 mg tablet use as directed 1 each 0    predniSONE (DELTASONE) 20 MG tablet Prednisone 60 mg/ day for 3 days, 40 mg/day for 3 days,20 mg/ day for 3 days, (1/2 tablet )10 mg a day for 3 days. 20 tablet 0    [DISCONTINUED] fluticasone-salmeterol diskus inhaler 250-50 mcg Inhale 1 puff into the lungs 2 (two) times daily. Wash out mouth after use. 1 each 11     No facility-administered encounter medications on file as of 6/15/2023.     Plan:       Requested Prescriptions     Signed Prescriptions Disp Refills    budesonide-glycopyr-formoterol (BREZTRI  AEROSPHERE) 160-9-4.8 mcg/actuation HFAA 10.7 g 11     Sig: Inhale 2 puffs into the lungs 2 (two) times a day.    ipratropium (ATROVENT) 21 mcg (0.03 %) nasal spray 30 mL 11     Si sprays by Each Nostril route 3 (three) times daily as needed for Rhinitis.    predniSONE (DELTASONE) 20 MG tablet 20 tablet 0     Sig: Prednisone 60 mg/ day for 3 days, 40 mg/day for 3 days,20 mg/ day for 3 days, (1/2 tablet )10 mg a day for 3 days.    doxycycline (VIBRA-TABS) 100 MG tablet 20 tablet 0     Sig: Take 1 tablet (100 mg total) by mouth 2 (two) times daily.     Problem List Items Addressed This Visit       Asthma with COPD    Relevant Medications    budesonide-glycopyr-formoterol (BREZTRI AEROSPHERE) 160-9-4.8 mcg/actuation HFAA    predniSONE (DELTASONE) 20 MG tablet    doxycycline (VIBRA-TABS) 100 MG tablet    Other Relevant Orders    Spirometry with/without bronchodilator    Morbid obesity - Primary    Rheumatoid arthritis, involving unspecified site, unspecified whether rheumatoid factor present     Other Visit Diagnoses       Chronic vasomotor rhinitis        Relevant Medications    ipratropium (ATROVENT) 21 mcg (0.03 %) nasal spray             Follow up in about 7 months (around 1/15/2024) for CPAP download - annual review, Review rahel - on return.    MEDICAL DECISION MAKING: Moderate to high complexity.  Overall, the multiple problems listed are of moderate to high severity that may impact quality of life and activities of daily living. Side effects of medications, treatment plan as well as options and alternatives reviewed and discussed with patient. There was counseling of patient concerning these issues.    Total time spent in counseling and coordination of care - 30    minutes of total time spent on the encounter, which includes face to face time and non-face to face time preparing to see the patient (eg, review of tests), Obtaining and/or reviewing separately obtained history, Documenting clinical information  in the electronic or other health record, Independently interpreting results (not separately reported) and communicating results to the patient/family/caregiver, or Care coordination (not separately reported).    Time was used in discussion of prognosis, risks, benefits of treatment, instructions and compliance with regimen . Discussion with other physicians and/or health care providers - home health or for use of durable medical equipment (oxygen, nebulizers, CPAP, BiPAP) occurred.

## 2024-01-11 ENCOUNTER — PATIENT MESSAGE (OUTPATIENT)
Dept: PULMONOLOGY | Facility: CLINIC | Age: 74
End: 2024-01-11
Payer: MEDICARE

## 2024-01-18 ENCOUNTER — OFFICE VISIT (OUTPATIENT)
Dept: PULMONOLOGY | Facility: CLINIC | Age: 74
End: 2024-01-18
Payer: MEDICARE

## 2024-01-18 VITALS
WEIGHT: 210 LBS | BODY MASS INDEX: 27.83 KG/M2 | HEART RATE: 54 BPM | RESPIRATION RATE: 17 BRPM | OXYGEN SATURATION: 99 % | HEIGHT: 73 IN | SYSTOLIC BLOOD PRESSURE: 122 MMHG | DIASTOLIC BLOOD PRESSURE: 68 MMHG

## 2024-01-18 DIAGNOSIS — R05.3 CHRONIC COUGH: ICD-10-CM

## 2024-01-18 DIAGNOSIS — G47.33 OSA (OBSTRUCTIVE SLEEP APNEA): ICD-10-CM

## 2024-01-18 DIAGNOSIS — J44.89 ASTHMA WITH COPD: Primary | ICD-10-CM

## 2024-01-18 PROCEDURE — 99214 OFFICE O/P EST MOD 30 MIN: CPT | Mod: S$GLB,,, | Performed by: INTERNAL MEDICINE

## 2024-01-18 PROCEDURE — 99999 PR PBB SHADOW E&M-EST. PATIENT-LVL IV: CPT | Mod: PBBFAC,,, | Performed by: INTERNAL MEDICINE

## 2024-01-18 RX ORDER — FLUTICASONE FUROATE AND VILANTEROL 200; 25 UG/1; UG/1
1 POWDER RESPIRATORY (INHALATION) DAILY
Qty: 60 EACH | Refills: 11 | Status: SHIPPED | OUTPATIENT
Start: 2024-01-18

## 2024-01-18 RX ORDER — ALBUTEROL SULFATE 90 UG/1
2 AEROSOL, METERED RESPIRATORY (INHALATION) EVERY 4 HOURS PRN
Qty: 18 G | Refills: 11 | Status: SHIPPED | OUTPATIENT
Start: 2024-01-18

## 2024-01-18 RX ORDER — PROMETHAZINE HYDROCHLORIDE AND DEXTROMETHORPHAN HYDROBROMIDE 6.25; 15 MG/5ML; MG/5ML
5 SYRUP ORAL EVERY 6 HOURS PRN
Qty: 240 ML | Refills: 5 | Status: SHIPPED | OUTPATIENT
Start: 2024-01-18

## 2024-01-18 NOTE — PROGRESS NOTES
Subjective:     Patient ID: Speedy Valle is a 74 y.o. male.    Chief Complaint:  Not using Continuous Positive Airway Pressure     HPI   Patient is former smoker, quit many years ago.    He has a  history of asthma , has not been taking any controller medications regularly, not taking nebulizer solution occasionally takes albuterol c/o cough every night and requests cough medication. Cough largely related to postnasal drip  Additionally has a history of Obstructive Sleep Apnea   Constant cough at night  - keps him up at night  Noncompliant with jet nebs  1 year follow up on Asthma with review of chest xray, no recent spirometry (COVID 19)- prior studies were normal . Chest xray is clear.      Asthma Follow-up  The patient has previously been evaluated here for asthma and presents for an asthma follow-up. The patient is currently having symptoms / an exacerbation. Current symptoms include dyspnea, non-productive cough and wheezing. Symptoms have been present since several months ago and have been unchanged. He denies dyspnea, non-productive cough and wheezing. Associated symptoms include shortness of breath.  This episode appears to have been triggered by no identifiable factor. Treatments tried for the current exacerbation include short-acting inhaled beta-adrenergic agonists, which have provided some relief of symptoms. The patient has been having similar episodes for approximately 10 years.     Current Disease Severity  The patient is having daytime symptoms daily. The patient is having daytime symptoms often 7 times per week. The patient is using short-acting beta agonists for symptom control daily. He has exacerbations requiring oral systemic corticosteroids 1 times per year. Current limitations in activity from asthma: none. Number of days of school or work missed in the last month: not applicable. Number of urgent/emergent visit in last year: 0.  The patient is not using a spacer with MDIs. His best peak flow  rate is na. He is not monitoring peak flow rates at home.     Sleep Apnea  He presents for a sleep evaluation. He complains of snorting, decreased concentration, excessive daytime sleepiness, falling asleep while reading, watching television, sinus problems, congested nose, difficulty falling asleep once awakened, feels sleepy during the day, take naps during the day.  Symptoms began 10 years ago, gradually worsening since that time.  He goes to sleep at 12 weekdays and  weekends. He awakens 5 weekdays and  weekends. He falls asleep in 10 minutes.  Collar size na. He denies knees buckling with laughing, completely or partially paralyzed while falling asleep or waking up. Previous evaluation and treatment has included PSG and PSG with C PAP titration.    Obstructive Sleep Apnea on Continuous Positive Airway Pressure:  Patient is not using CPAP as prescribed and benefiting from therapy. Patient has complaints of none    Past Medical History:   Diagnosis Date    Allergic rhinitis     Anxiety with depression     Arthritis     Asthma with COPD     Benign colon polyp     Coronary artery disease     ED (erectile dysfunction)     GERD (gastroesophageal reflux disease)     Hyperlipidemia     Hypertension     MI (myocardial infarction) 2012    ANDRZEJ on CPAP     PVD (peripheral vascular disease)     RLS (restless legs syndrome)      Past Surgical History:   Procedure Laterality Date    CARDIAC CATHETERIZATION  2010    stents x 2    cardiac tracer      right hip replacement       Review of patient's allergies indicates:  No Known Allergies  Current Outpatient Medications on File Prior to Visit   Medication Sig Dispense Refill    amLODIPine (NORVASC) 10 MG tablet Take 10 mg by mouth once daily.      aspirin 81 mg Cap Take by mouth daily as needed.      atorvastatin (LIPITOR) 20 MG tablet Take 20 mg by mouth once daily.  3    clopidogrel (PLAVIX) 75 mg tablet Take 75 mg by mouth once daily.  1    digoxin (LANOXIN) 250 mcg tablet  Take 0.25 mg by mouth once daily.      doxycycline (MONODOX) 100 MG capsule Take 1 capsule (100 mg total) by mouth every 12 (twelve) hours. 20 capsule 1    doxycycline (VIBRA-TABS) 100 MG tablet Take 1 tablet (100 mg total) by mouth 2 (two) times daily. 20 tablet 0    gabapentin (NEURONTIN) 300 MG capsule Take 300 mg by mouth 3 (three) times daily.       inhalation device ([employer] HOLDING CHAMBER) Use as directed for inhalation. 1 Device prn    ipratropium (ATROVENT) 21 mcg (0.03 %) nasal spray 2 sprays by Each Nostril route 3 (three) times daily as needed for Rhinitis. 30 mL 11    metoprolol succinate (TOPROL-XL) 50 MG 24 hr tablet Take 50 mg by mouth 2 (two) times daily.      pantoprazole (PROTONIX) 40 MG tablet Take 1 tablet (40 mg total) by mouth once daily. 90 tablet 1    ranolazine (RANEXA) 500 MG Tb12 Take 500 mg by mouth 2 (two) times daily.      sertraline (ZOLOFT) 50 MG tablet Take 50 mg by mouth daily as needed.      sildenafiL (VIAGRA) 100 MG tablet Take 100 mg by mouth daily as needed.      [DISCONTINUED] albuterol (VENTOLIN HFA) 90 mcg/actuation inhaler Inhale 2 puffs into the lungs every 4 (four) hours as needed for Shortness of Breath. 18 g 11    [DISCONTINUED] budesonide-glycopyr-formoterol (BREZTRI AEROSPHERE) 160-9-4.8 mcg/actuation HFAA Inhale 2 puffs into the lungs 2 (two) times a day. 10.7 g 11    [DISCONTINUED] fluticasone propionate (FLONASE) 50 mcg/actuation nasal spray 2 sprays (100 mcg total) by Each Nostril route once daily. 16 g 11    [DISCONTINUED] HYDROcodone-acetaminophen (NORCO)  mg per tablet Take 1 tablet by mouth 2 (two) times daily as needed.      [DISCONTINUED] multivitamin with folic acid 400 mcg Tab Take 1 tablet by mouth once daily.      [DISCONTINUED] NIFEdipine (PROCARDIA-XL) 60 MG (OSM) 24 hr tablet Take 60 mg by mouth once daily.      [DISCONTINUED] predniSONE (DELTASONE) 20 MG tablet Prednisone 60 mg/ day for 3 days, 40 mg/day for 3 days,20 mg/ day for 3 days, (1/2  tablet )10 mg a day for 3 days. 20 tablet 0    [DISCONTINUED] promethazine-dextromethorphan (PROMETHAZINE-DM) 6.25-15 mg/5 mL Syrp Take 5 mLs by mouth every 6 (six) hours as needed (cough). 240 mL 5     No current facility-administered medications on file prior to visit.     Social History     Socioeconomic History    Marital status:    Occupational History    Occupation: Retired   Tobacco Use    Smoking status: Former     Current packs/day: 0.00     Average packs/day: 1.5 packs/day for 14.0 years (21.0 ttl pk-yrs)     Types: Cigarettes     Start date: 1968     Quit date: 1982     Years since quittin.3    Smokeless tobacco: Former     Types: Snuff     Quit date: 1996   Substance and Sexual Activity    Alcohol use: No     Alcohol/week: 0.0 standard drinks of alcohol    Drug use: Yes     Types: Marijuana     Comment: every now and then    Sexual activity: Yes     Partners: Female     Social Determinants of Health     Financial Resource Strain: Low Risk  (2022)    Overall Financial Resource Strain (CARDIA)     Difficulty of Paying Living Expenses: Not very hard   Food Insecurity: No Food Insecurity (2022)    Hunger Vital Sign     Worried About Running Out of Food in the Last Year: Never true     Ran Out of Food in the Last Year: Never true   Transportation Needs: No Transportation Needs (2022)    PRAPARE - Transportation     Lack of Transportation (Medical): No     Lack of Transportation (Non-Medical): No   Physical Activity: Inactive (2022)    Exercise Vital Sign     Days of Exercise per Week: 0 days     Minutes of Exercise per Session: 0 min   Social Connections: Socially Integrated (2022)    Social Connection and Isolation Panel [NHANES]     Frequency of Communication with Friends and Family: More than three times a week     Frequency of Social Gatherings with Friends and Family: More than three times a week     Attends Faith Services: More than 4 times per  "year     Active Member of Clubs or Organizations: No     Attends Club or Organization Meetings: More than 4 times per year     Marital Status:    Housing Stability: Unknown (5/11/2022)    Housing Stability Vital Sign     Unable to Pay for Housing in the Last Year: No     Unstable Housing in the Last Year: No     Family History   Problem Relation Age of Onset    Hypertension Mother     Stroke Mother     No Known Problems Daughter     No Known Problems Daughter     No Known Problems Daughter     Bipolar disorder Son        Review of Systems   Constitutional:  Positive for fatigue. Negative for fever.   HENT:  Positive for postnasal drip, rhinorrhea and congestion.    Eyes:  Negative for redness and itching.   Respiratory:  Positive for apnea, cough, sputum production, shortness of breath, dyspnea on extertion, use of rescue inhaler and Paroxysmal Nocturnal Dyspnea.    Cardiovascular:  Negative for chest pain, palpitations and leg swelling.   Genitourinary:  Negative for difficulty urinating and hematuria.   Endocrine:  Negative for cold intolerance and heat intolerance.    Musculoskeletal:  Positive for back pain.   Skin:  Negative for rash.   Gastrointestinal:  Negative for nausea and abdominal pain.   Neurological:  Negative for dizziness, syncope, weakness and light-headedness.   Hematological:  Negative for adenopathy. Does not bruise/bleed easily.   Psychiatric/Behavioral:  Negative for sleep disturbance. The patient is not nervous/anxious.        Objective:      /68   Pulse (!) 54   Resp 17   Ht 6' 1" (1.854 m)   Wt 95.3 kg (210 lb)   SpO2 99%   BMI 27.71 kg/m²   Physical Exam  Vitals and nursing note reviewed.   Constitutional:       Appearance: He is well-developed.   HENT:      Head: Normocephalic and atraumatic.   Eyes:      Conjunctiva/sclera: Conjunctivae normal.      Pupils: Pupils are equal, round, and reactive to light.   Neck:      Thyroid: No thyromegaly.      Vascular: No JVD.      " Trachea: No tracheal deviation.   Cardiovascular:      Rate and Rhythm: Normal rate and regular rhythm.      Heart sounds: No murmur heard.  Pulmonary:      Breath sounds: Examination of the right-lower field reveals wheezing. Examination of the left-lower field reveals wheezing. Decreased breath sounds and wheezing present. No rhonchi or rales.   Abdominal:      General: Bowel sounds are normal.      Palpations: Abdomen is soft.   Musculoskeletal:         General: No tenderness. Normal range of motion.      Cervical back: Neck supple.   Lymphadenopathy:      Cervical: No cervical adenopathy.   Skin:     General: Skin is warm and dry.   Neurological:      Mental Status: He is alert and oriented to person, place, and time.       Personal Diagnostic Review  Chest x-ray: Stabel  X-Ray Chest PA And Lateral  Narrative: EXAM:  XR CHEST PA AND LATERAL    CLINICAL HISTORY:  COPD.    COMPARISON: 01/19/2021 and 06/01/2022 chest radiograph.    TECHNIQUE: PA and lateral views of the chest.    FINDINGS:    Medical support devices: Stable positioning and appearance.  Cardiomediastinal structures: Unchanged.  Lungs/pleura: No acute airspace or interstitial disease.  No pleural effusion. No pneumothorax.  Miscellaneous: No free air under the diaphragm. No acute osseous abnormality. Mild thoracic spondylosis.  Impression: 1. Clear chest without acute cardiopulmonary process.    Finalized on: 6/14/2023 9:11 AM By:  Buck Montalvo III, MD  RG# [id number]      2023-06-14 09:14:01.307    [facility]          Procedures  Eris Arboleda MD (Physician)   Pulmonary Disease  Procedure Orders   1. Home Sleep Studies [[id number]] ordered by Alfred Bernardo MD   Pre-procedure Diagnoses   1. ANDRZEJ on CPAP [G47.33, Z99.89]   Post-procedure Diagnoses   1. ANDRZEJ on CPAP [G47.33, Z99.89]      Home Sleep Studies     Date/Time: 2/3/2021 8:00 AM  Performed by: Eris Arboleda MD  Authorized by: Alfred Bernardo MD        2 night study  SEVERE OBSTRUCTIVE  "SLEEP APNEA with overall AHI 35.9/hr ( 219 events): night #2  Oxygen desaturation: 87 %. SpO2 between 90% to 94% for 37 min.  Patient snored 72% time above 50 .  Heart rate range: 55 bpm - 92 bpm  REC's:  CPAP titration  Therapy with APAP at 6-20 cm WP using mask of choice with heated humidification is an option                1/18/2024     8:40 AM   Pulmonary Studies Review   SpO2 99 %   Height 6' 1" (1.854 m)   Weight 95.3 kg (210 lb)   BMI (Calculated) 27.7   Predicted Distance 331.92   Predicted Distance Meters (Calculated) 565.39 meters       X-Ray Chest PA And Lateral  Narrative: EXAM:  XR CHEST PA AND LATERAL    CLINICAL HISTORY:  COPD.    COMPARISON: 01/19/2021 and 06/01/2022 chest radiograph.    TECHNIQUE: PA and lateral views of the chest.    FINDINGS:    Medical support devices: Stable positioning and appearance.  Cardiomediastinal structures: Unchanged.  Lungs/pleura: No acute airspace or interstitial disease.  No pleural effusion. No pneumothorax.  Miscellaneous: No free air under the diaphragm. No acute osseous abnormality. Mild thoracic spondylosis.  Impression: 1. Clear chest without acute cardiopulmonary process.    Finalized on: 6/14/2023 9:11 AM By:  Buck Montalvo III, MD  [facility]# [id number]      2023-06-14 09:14:01.307    [facility]      Office Spirometry Results:         1/18/2024     8:40 AM 6/15/2023     2:10 PM 5/11/2022     8:10 AM 4/18/2022     9:41 AM 1/26/2022     8:13 AM 10/26/2021     2:38 PM 10/15/2021    11:12 AM   Pulmonary Function Tests   SpO2 99 % 99 % 98 % 99 % 98 % 98 % 99 %   Height 6' 1" (1.854 m) 6' 1" (1.854 m) 6' 1" (1.854 m) 6' 1" (1.854 m) 6' 1" (1.854 m) 6' 1" (1.854 m) 6' 1" (1.854 m)   Weight 95.3 kg (210 lb) 95.7 kg (210 lb 15.7 oz) 94.6 kg (208 lb 8.9 oz) 96.1 kg (211 lb 13.8 oz) 96.7 kg (213 lb 3 oz) 92.3 kg (203 lb 7.8 oz) 97 kg (213 lb 13.5 oz)   BMI (Calculated) 27.7 27.8 27.5 28 28.1 26.9 28.2         1/18/2024     8:40 AM   Pulmonary Studies Review   SpO2 99 %   Height " "6' 1" (1.854 m)   Weight 95.3 kg (210 lb)   BMI (Calculated) 27.7   Predicted Distance 331.92   Predicted Distance Meters (Calculated) 565.39 meters         Assessment:       ANDRZEJ (obstructive sleep apnea)  Noncompliant    Asthma with COPD  -     fluticasone furoate-vilanteroL (BREO ELLIPTA) 200-25 mcg/dose DsDv diskus inhaler; Inhale 1 puff into the lungs once daily. Controller  Dispense: 60 each; Refill: 11  -     albuterol (VENTOLIN HFA) 90 mcg/actuation inhaler; Inhale 2 puffs into the lungs every 4 (four) hours as needed for Shortness of Breath.  Dispense: 18 g; Refill: 11  -     Spirometry with/without bronchodilator; Future; Expected date: 07/20/2024    Chronic cough  -     promethazine-dextromethorphan (PROMETHAZINE-DM) 6.25-15 mg/5 mL Syrp; Take 5 mLs by mouth every 6 (six) hours as needed (cough).  Dispense: 240 mL; Refill: 5    ANDRZEJ (obstructive sleep apnea)          Outpatient Encounter Medications as of 1/18/2024   Medication Sig Dispense Refill    amLODIPine (NORVASC) 10 MG tablet Take 10 mg by mouth once daily.      aspirin 81 mg Cap Take by mouth daily as needed.      atorvastatin (LIPITOR) 20 MG tablet Take 20 mg by mouth once daily.  3    clopidogrel (PLAVIX) 75 mg tablet Take 75 mg by mouth once daily.  1    digoxin (LANOXIN) 250 mcg tablet Take 0.25 mg by mouth once daily.      doxycycline (MONODOX) 100 MG capsule Take 1 capsule (100 mg total) by mouth every 12 (twelve) hours. 20 capsule 1    doxycycline (VIBRA-TABS) 100 MG tablet Take 1 tablet (100 mg total) by mouth 2 (two) times daily. 20 tablet 0    gabapentin (NEURONTIN) 300 MG capsule Take 300 mg by mouth 3 (three) times daily.       inhalation device ([facility] HOLDING CHAMBER) Use as directed for inhalation. 1 Device prn    ipratropium (ATROVENT) 21 mcg (0.03 %) nasal spray 2 sprays by Each Nostril route 3 (three) times daily as needed for Rhinitis. 30 mL 11    metoprolol succinate (TOPROL-XL) 50 MG 24 hr tablet Take 50 mg by mouth 2 (two) " times daily.      pantoprazole (PROTONIX) 40 MG tablet Take 1 tablet (40 mg total) by mouth once daily. 90 tablet 1    ranolazine (RANEXA) 500 MG Tb12 Take 500 mg by mouth 2 (two) times daily.      sertraline (ZOLOFT) 50 MG tablet Take 50 mg by mouth daily as needed.      sildenafiL (VIAGRA) 100 MG tablet Take 100 mg by mouth daily as needed.      [DISCONTINUED] albuterol (VENTOLIN HFA) 90 mcg/actuation inhaler Inhale 2 puffs into the lungs every 4 (four) hours as needed for Shortness of Breath. 18 g 11    [DISCONTINUED] budesonide-glycopyr-formoterol (BREZTRI AEROSPHERE) 160-9-4.8 mcg/actuation HFAA Inhale 2 puffs into the lungs 2 (two) times a day. 10.7 g 11    [DISCONTINUED] fluticasone propionate (FLONASE) 50 mcg/actuation nasal spray 2 sprays (100 mcg total) by Each Nostril route once daily. 16 g 11    [DISCONTINUED] HYDROcodone-acetaminophen (NORCO)  mg per tablet Take 1 tablet by mouth 2 (two) times daily as needed.      [DISCONTINUED] multivitamin with folic acid 400 mcg Tab Take 1 tablet by mouth once daily.      [DISCONTINUED] NIFEdipine (PROCARDIA-XL) 60 MG (OSM) 24 hr tablet Take 60 mg by mouth once daily.      [DISCONTINUED] predniSONE (DELTASONE) 20 MG tablet Prednisone 60 mg/ day for 3 days, 40 mg/day for 3 days,20 mg/ day for 3 days, (1/2 tablet )10 mg a day for 3 days. 20 tablet 0    [DISCONTINUED] promethazine-dextromethorphan (PROMETHAZINE-DM) 6.25-15 mg/5 mL Syrp Take 5 mLs by mouth every 6 (six) hours as needed (cough). 240 mL 5    albuterol (VENTOLIN HFA) 90 mcg/actuation inhaler Inhale 2 puffs into the lungs every 4 (four) hours as needed for Shortness of Breath. 18 g 11    fluticasone furoate-vilanteroL (BREO ELLIPTA) 200-25 mcg/dose DsDv diskus inhaler Inhale 1 puff into the lungs once daily. Controller 60 each 11    promethazine-dextromethorphan (PROMETHAZINE-DM) 6.25-15 mg/5 mL Syrp Take 5 mLs by mouth every 6 (six) hours as needed (cough). 240 mL 5     No facility-administered  encounter medications on file as of 1/18/2024.     Plan:       Requested Prescriptions     Signed Prescriptions Disp Refills    fluticasone furoate-vilanteroL (BREO ELLIPTA) 200-25 mcg/dose DsDv diskus inhaler 60 each 11     Sig: Inhale 1 puff into the lungs once daily. Controller    promethazine-dextromethorphan (PROMETHAZINE-DM) 6.25-15 mg/5 mL Syrp 240 mL 5     Sig: Take 5 mLs by mouth every 6 (six) hours as needed (cough).    albuterol (VENTOLIN HFA) 90 mcg/actuation inhaler 18 g 11     Sig: Inhale 2 puffs into the lungs every 4 (four) hours as needed for Shortness of Breath.     Problem List Items Addressed This Visit       Asthma with COPD - Primary    Relevant Medications    fluticasone furoate-vilanteroL (BREO ELLIPTA) 200-25 mcg/dose DsDv diskus inhaler    albuterol (VENTOLIN HFA) 90 mcg/actuation inhaler    Other Relevant Orders    Spirometry with/without bronchodilator    ANDRZEJ (obstructive sleep apnea)    Overview     Noncompliant           Current Assessment & Plan     Noncompliant          Other Visit Diagnoses       Chronic cough        Relevant Medications    promethazine-dextromethorphan (PROMETHAZINE-DM) 6.25-15 mg/5 mL Syrp             Follow up in about 1 year (around 1/18/2025) for rahel - on return, Follow up with NP.    MEDICAL DECISION MAKING: Moderate to high complexity.  Overall, the multiple problems listed are of moderate to high severity that may impact quality of life and activities of daily living. Side effects of medications, treatment plan as well as options and alternatives reviewed and discussed with patient. There was counseling of patient concerning these issues.    Total time spent in counseling and coordination of care - 30    minutes of total time spent on the encounter, which includes face to face time and non-face to face time preparing to see the patient (eg, review of tests), Obtaining and/or reviewing separately obtained history, Documenting clinical information in the  electronic or other health record, Independently interpreting results (not separately reported) and communicating results to the patient/family/caregiver, or Care coordination (not separately reported).    Time was used in discussion of prognosis, risks, benefits of treatment, instructions and compliance with regimen . Discussion with other physicians and/or health care providers - home health or for use of durable medical equipment (oxygen, nebulizers, CPAP, BiPAP) occurred.

## 2024-04-03 ENCOUNTER — PATIENT MESSAGE (OUTPATIENT)
Dept: PULMONOLOGY | Facility: CLINIC | Age: 74
End: 2024-04-03
Payer: MEDICARE

## 2024-11-11 DIAGNOSIS — R05.3 CHRONIC COUGH: ICD-10-CM

## 2024-11-11 RX ORDER — PROMETHAZINE HYDROCHLORIDE AND DEXTROMETHORPHAN HYDROBROMIDE 6.25; 15 MG/5ML; MG/5ML
SYRUP ORAL
Qty: 240 ML | Refills: 5 | OUTPATIENT
Start: 2024-11-11

## 2024-11-14 RX ORDER — PROMETHAZINE HYDROCHLORIDE AND DEXTROMETHORPHAN HYDROBROMIDE 6.25; 15 MG/5ML; MG/5ML
5 SYRUP ORAL EVERY 6 HOURS PRN
Qty: 240 ML | Refills: 0 | Status: SHIPPED | OUTPATIENT
Start: 2024-11-14

## 2024-12-06 ENCOUNTER — TELEPHONE (OUTPATIENT)
Dept: PULMONOLOGY | Facility: CLINIC | Age: 74
End: 2024-12-06
Payer: MEDICARE